# Patient Record
Sex: MALE | Race: WHITE | Employment: UNEMPLOYED | URBAN - METROPOLITAN AREA
[De-identification: names, ages, dates, MRNs, and addresses within clinical notes are randomized per-mention and may not be internally consistent; named-entity substitution may affect disease eponyms.]

---

## 2017-09-14 ENCOUNTER — APPOINTMENT (OUTPATIENT)
Dept: GENERAL RADIOLOGY | Age: 67
End: 2017-09-14
Payer: MEDICARE

## 2017-09-14 ENCOUNTER — HOSPITAL ENCOUNTER (OUTPATIENT)
Age: 67
Setting detail: OBSERVATION
Discharge: NURSING, REHAB OR LONG-TERM CARE | End: 2017-09-15
Attending: INTERNAL MEDICINE | Admitting: INTERNAL MEDICINE
Payer: MEDICARE

## 2017-09-14 DIAGNOSIS — R06.09 DYSPNEA ON EXERTION: Primary | ICD-10-CM

## 2017-09-14 DIAGNOSIS — R60.9 PERIPHERAL EDEMA: ICD-10-CM

## 2017-09-14 DIAGNOSIS — L30.9 DERMATITIS: ICD-10-CM

## 2017-09-14 DIAGNOSIS — R79.89 ELEVATED D-DIMER: ICD-10-CM

## 2017-09-14 LAB
BASOPHILS ABSOLUTE: 0.1 K/UL (ref 0–0.2)
BASOPHILS RELATIVE PERCENT: 0.9 %
EOSINOPHILS ABSOLUTE: 3.9 K/UL (ref 0–0.7)
EOSINOPHILS RELATIVE PERCENT: 28.9 %
HCT VFR BLD CALC: 37 % (ref 42–52)
HEMOGLOBIN: 12 G/DL (ref 14–18)
LYMPHOCYTES ABSOLUTE: 2.1 K/UL (ref 1–4.8)
LYMPHOCYTES RELATIVE PERCENT: 15.7 %
MCH RBC QN AUTO: 31.4 PG (ref 27–31.3)
MCHC RBC AUTO-ENTMCNC: 32.5 % (ref 33–37)
MCV RBC AUTO: 96.7 FL (ref 80–100)
MONOCYTES ABSOLUTE: 1.2 K/UL (ref 0.2–0.8)
MONOCYTES RELATIVE PERCENT: 8.8 %
NEUTROPHILS ABSOLUTE: 6.1 K/UL (ref 1.4–6.5)
NEUTROPHILS RELATIVE PERCENT: 45.7 %
PDW BLD-RTO: 14.5 % (ref 11.5–14.5)
PLATELET # BLD: 183 K/UL (ref 130–400)
RBC # BLD: 3.83 M/UL (ref 4.7–6.1)
WBC # BLD: 13.4 K/UL (ref 4.8–10.8)

## 2017-09-14 PROCEDURE — 80053 COMPREHEN METABOLIC PANEL: CPT

## 2017-09-14 PROCEDURE — 6360000002 HC RX W HCPCS: Performed by: PHYSICIAN ASSISTANT

## 2017-09-14 PROCEDURE — 71010 XR CHEST PORTABLE: CPT

## 2017-09-14 PROCEDURE — 85025 COMPLETE CBC W/AUTO DIFF WBC: CPT

## 2017-09-14 PROCEDURE — 96375 TX/PRO/DX INJ NEW DRUG ADDON: CPT

## 2017-09-14 PROCEDURE — 83735 ASSAY OF MAGNESIUM: CPT

## 2017-09-14 PROCEDURE — 82550 ASSAY OF CK (CPK): CPT

## 2017-09-14 PROCEDURE — 82553 CREATINE MB FRACTION: CPT

## 2017-09-14 PROCEDURE — 85379 FIBRIN DEGRADATION QUANT: CPT

## 2017-09-14 PROCEDURE — 99285 EMERGENCY DEPT VISIT HI MDM: CPT

## 2017-09-14 PROCEDURE — 84484 ASSAY OF TROPONIN QUANT: CPT

## 2017-09-14 PROCEDURE — 93005 ELECTROCARDIOGRAM TRACING: CPT

## 2017-09-14 PROCEDURE — 83880 ASSAY OF NATRIURETIC PEPTIDE: CPT

## 2017-09-14 PROCEDURE — 36415 COLL VENOUS BLD VENIPUNCTURE: CPT

## 2017-09-14 PROCEDURE — 96374 THER/PROPH/DIAG INJ IV PUSH: CPT

## 2017-09-14 RX ORDER — DIPHENHYDRAMINE HYDROCHLORIDE 50 MG/ML
50 INJECTION INTRAMUSCULAR; INTRAVENOUS ONCE
Status: COMPLETED | OUTPATIENT
Start: 2017-09-14 | End: 2017-09-14

## 2017-09-14 RX ADMIN — DIPHENHYDRAMINE HYDROCHLORIDE 50 MG: 50 INJECTION, SOLUTION INTRAMUSCULAR; INTRAVENOUS at 23:29

## 2017-09-15 ENCOUNTER — APPOINTMENT (OUTPATIENT)
Dept: ULTRASOUND IMAGING | Age: 67
End: 2017-09-15
Payer: MEDICARE

## 2017-09-15 ENCOUNTER — APPOINTMENT (OUTPATIENT)
Dept: CT IMAGING | Age: 67
End: 2017-09-15
Payer: MEDICARE

## 2017-09-15 VITALS
TEMPERATURE: 97.7 F | BODY MASS INDEX: 30.46 KG/M2 | RESPIRATION RATE: 16 BRPM | HEIGHT: 68 IN | WEIGHT: 201 LBS | HEART RATE: 100 BPM | SYSTOLIC BLOOD PRESSURE: 149 MMHG | DIASTOLIC BLOOD PRESSURE: 69 MMHG | OXYGEN SATURATION: 98 %

## 2017-09-15 PROBLEM — D64.9 ANEMIA: Status: ACTIVE | Noted: 2017-09-15

## 2017-09-15 PROBLEM — J44.9 COPD (CHRONIC OBSTRUCTIVE PULMONARY DISEASE) (HCC): Status: ACTIVE | Noted: 2017-09-15

## 2017-09-15 PROBLEM — I82.412 DEEP VEIN THROMBOSIS (DVT) OF FEMORAL VEIN OF LEFT LOWER EXTREMITY (HCC): Chronic | Status: ACTIVE | Noted: 2017-09-15

## 2017-09-15 PROBLEM — R60.0 BILATERAL LEG EDEMA: Status: ACTIVE | Noted: 2017-09-15
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
## 2017-09-15 LAB
ABO/RH: NORMAL
ALBUMIN SERPL-MCNC: 3.5 G/DL (ref 3.9–4.9)
ALP BLD-CCNC: 73 U/L (ref 35–104)
ALT SERPL-CCNC: 13 U/L (ref 0–41)
ANION GAP SERPL CALCULATED.3IONS-SCNC: 13 MEQ/L (ref 7–13)
ANION GAP SERPL CALCULATED.3IONS-SCNC: 15 MEQ/L (ref 7–13)
ANTIBODY SCREEN: NORMAL
AST SERPL-CCNC: 19 U/L (ref 0–40)
BASOPHILS ABSOLUTE: 0.1 K/UL (ref 0–0.2)
BASOPHILS RELATIVE PERCENT: 0.8 %
BILIRUB SERPL-MCNC: 0.3 MG/DL (ref 0–1.2)
BUN BLDV-MCNC: 18 MG/DL (ref 8–23)
BUN BLDV-MCNC: 19 MG/DL (ref 8–23)
CALCIUM SERPL-MCNC: 8 MG/DL (ref 8.6–10.2)
CALCIUM SERPL-MCNC: 8.5 MG/DL (ref 8.6–10.2)
CHLORIDE BLD-SCNC: 102 MEQ/L (ref 98–107)
CHLORIDE BLD-SCNC: 103 MEQ/L (ref 98–107)
CK MB: 1.6 NG/ML (ref 0–6.7)
CK MB: 1.7 NG/ML (ref 0–6.7)
CK MB: 2.6 NG/ML (ref 0–6.7)
CO2: 25 MEQ/L (ref 22–29)
CO2: 25 MEQ/L (ref 22–29)
CREAT SERPL-MCNC: 1.45 MG/DL (ref 0.7–1.2)
CREAT SERPL-MCNC: 1.52 MG/DL (ref 0.7–1.2)
CREATINE KINASE-MB INDEX: 0.9 % (ref 0–3.5)
CREATINE KINASE-MB INDEX: 1 % (ref 0–3.5)
CREATINE KINASE-MB INDEX: 1.2 % (ref 0–3.5)
D DIMER: 20.77 MG/L FEU (ref 0–0.5)
EOSINOPHILS ABSOLUTE: 3.2 K/UL (ref 0–0.7)
EOSINOPHILS RELATIVE PERCENT: 32.6 %
FERRITIN: 233.1 NG/ML (ref 30–400)
FOLATE: 9.8 NG/ML (ref 7.3–26.1)
GFR AFRICAN AMERICAN: 55.6
GFR AFRICAN AMERICAN: 58.8
GFR NON-AFRICAN AMERICAN: 46
GFR NON-AFRICAN AMERICAN: 48.6
GLOBULIN: 3.6 G/DL (ref 2.3–3.5)
GLUCOSE BLD-MCNC: 102 MG/DL (ref 74–109)
GLUCOSE BLD-MCNC: 107 MG/DL (ref 74–109)
HCT VFR BLD CALC: 32.9 % (ref 42–52)
HEMOGLOBIN: 10.8 G/DL (ref 14–18)
IRON SATURATION: 29 % (ref 11–46)
IRON: 73 UG/DL (ref 59–158)
LACTATE DEHYDROGENASE: 372 U/L (ref 135–225)
LV EF: 50 %
LVEF MODALITY: NORMAL
LYMPHOCYTES ABSOLUTE: 2.2 K/UL (ref 1–4.8)
LYMPHOCYTES RELATIVE PERCENT: 22 %
MAGNESIUM: 2.1 MG/DL (ref 1.7–2.3)
MCH RBC QN AUTO: 31.9 PG (ref 27–31.3)
MCHC RBC AUTO-ENTMCNC: 32.8 % (ref 33–37)
MCV RBC AUTO: 97.2 FL (ref 80–100)
MONOCYTES ABSOLUTE: 0.9 K/UL (ref 0.2–0.8)
MONOCYTES RELATIVE PERCENT: 8.7 %
NEUTROPHILS ABSOLUTE: 3.6 K/UL (ref 1.4–6.5)
NEUTROPHILS RELATIVE PERCENT: 35.9 %
PDW BLD-RTO: 14.6 % (ref 11.5–14.5)
PLATELET # BLD: 164 K/UL (ref 130–400)
POTASSIUM SERPL-SCNC: 4.1 MEQ/L (ref 3.5–5.1)
POTASSIUM SERPL-SCNC: 4.4 MEQ/L (ref 3.5–5.1)
PRO-BNP: 33 PG/ML
RBC # BLD: 3.39 M/UL (ref 4.7–6.1)
SODIUM BLD-SCNC: 141 MEQ/L (ref 132–144)
SODIUM BLD-SCNC: 142 MEQ/L (ref 132–144)
TOTAL CK: 173 U/L (ref 0–190)
TOTAL CK: 181 U/L (ref 0–190)
TOTAL CK: 217 U/L (ref 0–190)
TOTAL IRON BINDING CAPACITY: 253 UG/DL (ref 178–450)
TOTAL PROTEIN: 7.1 G/DL (ref 6.4–8.1)
TROPONIN: <0.01 NG/ML (ref 0–0.01)
VITAMIN B-12: 385 PG/ML (ref 211–946)
WBC # BLD: 9.9 K/UL (ref 4.8–10.8)

## 2017-09-15 PROCEDURE — 82746 ASSAY OF FOLIC ACID SERUM: CPT

## 2017-09-15 PROCEDURE — 82553 CREATINE MB FRACTION: CPT

## 2017-09-15 PROCEDURE — G0378 HOSPITAL OBSERVATION PER HR: HCPCS

## 2017-09-15 PROCEDURE — 82728 ASSAY OF FERRITIN: CPT

## 2017-09-15 PROCEDURE — 83010 ASSAY OF HAPTOGLOBIN QUANT: CPT

## 2017-09-15 PROCEDURE — 76775 US EXAM ABDO BACK WALL LIM: CPT

## 2017-09-15 PROCEDURE — 83615 LACTATE (LD) (LDH) ENZYME: CPT

## 2017-09-15 PROCEDURE — 96375 TX/PRO/DX INJ NEW DRUG ADDON: CPT

## 2017-09-15 PROCEDURE — 83540 ASSAY OF IRON: CPT

## 2017-09-15 PROCEDURE — 6360000002 HC RX W HCPCS: Performed by: PHYSICIAN ASSISTANT

## 2017-09-15 PROCEDURE — 93306 TTE W/DOPPLER COMPLETE: CPT

## 2017-09-15 PROCEDURE — 82607 VITAMIN B-12: CPT

## 2017-09-15 PROCEDURE — 2580000003 HC RX 258: Performed by: INTERNAL MEDICINE

## 2017-09-15 PROCEDURE — 85025 COMPLETE CBC W/AUTO DIFF WBC: CPT

## 2017-09-15 PROCEDURE — 36415 COLL VENOUS BLD VENIPUNCTURE: CPT

## 2017-09-15 PROCEDURE — 84484 ASSAY OF TROPONIN QUANT: CPT

## 2017-09-15 PROCEDURE — 6360000004 HC RX CONTRAST MEDICATION: Performed by: RADIOLOGY

## 2017-09-15 PROCEDURE — 71275 CT ANGIOGRAPHY CHEST: CPT

## 2017-09-15 PROCEDURE — 80048 BASIC METABOLIC PNL TOTAL CA: CPT

## 2017-09-15 PROCEDURE — 83550 IRON BINDING TEST: CPT

## 2017-09-15 PROCEDURE — 99222 1ST HOSP IP/OBS MODERATE 55: CPT | Performed by: INTERNAL MEDICINE

## 2017-09-15 PROCEDURE — 96372 THER/PROPH/DIAG INJ SC/IM: CPT

## 2017-09-15 PROCEDURE — 86850 RBC ANTIBODY SCREEN: CPT

## 2017-09-15 PROCEDURE — 96365 THER/PROPH/DIAG IV INF INIT: CPT

## 2017-09-15 PROCEDURE — 99214 OFFICE O/P EST MOD 30 MIN: CPT | Performed by: INTERNAL MEDICINE

## 2017-09-15 PROCEDURE — 82550 ASSAY OF CK (CPK): CPT

## 2017-09-15 PROCEDURE — 86900 BLOOD TYPING SEROLOGIC ABO: CPT

## 2017-09-15 PROCEDURE — 93970 EXTREMITY STUDY: CPT

## 2017-09-15 PROCEDURE — 6360000002 HC RX W HCPCS: Performed by: INTERNAL MEDICINE

## 2017-09-15 PROCEDURE — 99222 1ST HOSP IP/OBS MODERATE 55: CPT | Performed by: CLINICAL NURSE SPECIALIST

## 2017-09-15 PROCEDURE — 94664 DEMO&/EVAL PT USE INHALER: CPT

## 2017-09-15 PROCEDURE — 86901 BLOOD TYPING SEROLOGIC RH(D): CPT

## 2017-09-15 PROCEDURE — 6370000000 HC RX 637 (ALT 250 FOR IP): Performed by: INTERNAL MEDICINE

## 2017-09-15 RX ORDER — HYDROXYZINE HYDROCHLORIDE 50 MG/ML
50 INJECTION, SOLUTION INTRAMUSCULAR EVERY 6 HOURS PRN
Status: DISCONTINUED | OUTPATIENT
Start: 2017-09-15 | End: 2017-09-16 | Stop reason: HOSPADM

## 2017-09-15 RX ORDER — ACETAMINOPHEN 325 MG/1
650 TABLET ORAL EVERY 4 HOURS PRN
Status: DISCONTINUED | OUTPATIENT
Start: 2017-09-15 | End: 2017-09-15 | Stop reason: SDUPTHER

## 2017-09-15 RX ORDER — IPRATROPIUM BROMIDE AND ALBUTEROL SULFATE 2.5; .5 MG/3ML; MG/3ML
1 SOLUTION RESPIRATORY (INHALATION) EVERY 4 HOURS PRN
Status: DISCONTINUED | OUTPATIENT
Start: 2017-09-15 | End: 2017-09-16 | Stop reason: HOSPADM

## 2017-09-15 RX ORDER — IPRATROPIUM BROMIDE AND ALBUTEROL SULFATE 2.5; .5 MG/3ML; MG/3ML
1 SOLUTION RESPIRATORY (INHALATION) 3 TIMES DAILY
Status: DISCONTINUED | OUTPATIENT
Start: 2017-09-15 | End: 2017-09-15

## 2017-09-15 RX ORDER — ACETAMINOPHEN 325 MG/1
650 TABLET ORAL EVERY 4 HOURS PRN
Status: DISCONTINUED | OUTPATIENT
Start: 2017-09-15 | End: 2017-09-16 | Stop reason: HOSPADM

## 2017-09-15 RX ORDER — METHYLPREDNISOLONE SODIUM SUCCINATE 40 MG/ML
40 INJECTION, POWDER, LYOPHILIZED, FOR SOLUTION INTRAMUSCULAR; INTRAVENOUS EVERY 8 HOURS
Status: DISCONTINUED | OUTPATIENT
Start: 2017-09-15 | End: 2017-09-16 | Stop reason: HOSPADM

## 2017-09-15 RX ORDER — SODIUM CHLORIDE 0.9 % (FLUSH) 0.9 %
10 SYRINGE (ML) INJECTION PRN
Status: DISCONTINUED | OUTPATIENT
Start: 2017-09-15 | End: 2017-09-16 | Stop reason: HOSPADM

## 2017-09-15 RX ORDER — FOLIC ACID 1 MG/1
1 TABLET ORAL DAILY
Qty: 30 TABLET | Refills: 0 | DISCHARGE
Start: 2017-09-15

## 2017-09-15 RX ORDER — HALOPERIDOL 5 MG
5 TABLET ORAL EVERY 4 HOURS PRN
Status: DISCONTINUED | OUTPATIENT
Start: 2017-09-15 | End: 2017-09-16 | Stop reason: HOSPADM

## 2017-09-15 RX ORDER — HYDROXYZINE PAMOATE 25 MG/1
50 CAPSULE ORAL EVERY 6 HOURS PRN
Status: DISCONTINUED | OUTPATIENT
Start: 2017-09-15 | End: 2017-09-16 | Stop reason: HOSPADM

## 2017-09-15 RX ORDER — ONDANSETRON 2 MG/ML
4 INJECTION INTRAMUSCULAR; INTRAVENOUS EVERY 6 HOURS PRN
Status: DISCONTINUED | OUTPATIENT
Start: 2017-09-15 | End: 2017-09-16 | Stop reason: HOSPADM

## 2017-09-15 RX ORDER — SODIUM CHLORIDE 0.9 % (FLUSH) 0.9 %
10 SYRINGE (ML) INJECTION EVERY 12 HOURS SCHEDULED
Status: DISCONTINUED | OUTPATIENT
Start: 2017-09-15 | End: 2017-09-15 | Stop reason: SDUPTHER

## 2017-09-15 RX ORDER — SODIUM CHLORIDE 0.9 % (FLUSH) 0.9 %
10 SYRINGE (ML) INJECTION EVERY 12 HOURS SCHEDULED
Status: DISCONTINUED | OUTPATIENT
Start: 2017-09-15 | End: 2017-09-16 | Stop reason: HOSPADM

## 2017-09-15 RX ORDER — SODIUM CHLORIDE 0.9 % (FLUSH) 0.9 %
10 SYRINGE (ML) INJECTION PRN
Status: DISCONTINUED | OUTPATIENT
Start: 2017-09-15 | End: 2017-09-15 | Stop reason: SDUPTHER

## 2017-09-15 RX ORDER — IPRATROPIUM BROMIDE AND ALBUTEROL SULFATE 2.5; .5 MG/3ML; MG/3ML
1 SOLUTION RESPIRATORY (INHALATION) 3 TIMES DAILY
Status: DISCONTINUED | OUTPATIENT
Start: 2017-09-15 | End: 2017-09-15 | Stop reason: SDUPTHER

## 2017-09-15 RX ORDER — IPRATROPIUM BROMIDE AND ALBUTEROL SULFATE 2.5; .5 MG/3ML; MG/3ML
3 SOLUTION RESPIRATORY (INHALATION) EVERY 4 HOURS PRN
Qty: 360 ML | DISCHARGE
Start: 2017-09-15

## 2017-09-15 RX ORDER — HALOPERIDOL 5 MG/ML
5 INJECTION INTRAMUSCULAR EVERY 6 HOURS PRN
Status: DISCONTINUED | OUTPATIENT
Start: 2017-09-15 | End: 2017-09-16 | Stop reason: HOSPADM

## 2017-09-15 RX ORDER — THIAMINE MONONITRATE (VIT B1) 100 MG
100 TABLET ORAL DAILY
Qty: 30 TABLET | Refills: 0 | DISCHARGE
Start: 2017-09-15

## 2017-09-15 RX ORDER — M-VIT,TX,IRON,MINS/CALC/FOLIC 27MG-0.4MG
1 TABLET ORAL DAILY
Qty: 90 TABLET | Refills: 0 | Status: ON HOLD | DISCHARGE
Start: 2017-09-15 | End: 2019-01-19 | Stop reason: HOSPADM

## 2017-09-15 RX ADMIN — METHYLPREDNISOLONE SODIUM SUCCINATE 40 MG: 40 INJECTION, POWDER, FOR SOLUTION INTRAMUSCULAR; INTRAVENOUS at 10:31

## 2017-09-15 RX ADMIN — ENOXAPARIN SODIUM 90 MG: 100 INJECTION SUBCUTANEOUS at 01:57

## 2017-09-15 RX ADMIN — Medication 10 ML: at 10:22

## 2017-09-15 RX ADMIN — IOPAMIDOL 100 ML: 755 INJECTION, SOLUTION INTRAVENOUS at 01:36

## 2017-09-15 RX ADMIN — DEXTROSE MONOHYDRATE 250 MG: 5 INJECTION, SOLUTION INTRAVENOUS at 10:32

## 2017-09-15 RX ADMIN — RIVAROXABAN 15 MG: 15 TABLET, FILM COATED ORAL at 17:12

## 2017-09-15 ASSESSMENT — ENCOUNTER SYMPTOMS
EYE DISCHARGE: 0
ANAL BLEEDING: 0
VOICE CHANGE: 0
APNEA: 0
BLOOD IN STOOL: 0
EYES NEGATIVE: 1
ABDOMINAL DISTENTION: 0
PHOTOPHOBIA: 0
COUGH: 0
STRIDOR: 0
GASTROINTESTINAL NEGATIVE: 1
NAUSEA: 0
WHEEZING: 0
SHORTNESS OF BREATH: 1
CHEST TIGHTNESS: 0
VOMITING: 0

## 2017-09-15 ASSESSMENT — PAIN SCALES - GENERAL: PAINLEVEL_OUTOF10: 0

## 2017-09-17 LAB — HAPTOGLOBIN: 188 MG/DL (ref 30–200)

## 2017-09-18 LAB
EKG ATRIAL RATE: 85 BPM
EKG P AXIS: 23 DEGREES
EKG P-R INTERVAL: 172 MS
EKG Q-T INTERVAL: 362 MS
EKG QRS DURATION: 72 MS
EKG QTC CALCULATION (BAZETT): 430 MS
EKG R AXIS: 68 DEGREES
EKG T AXIS: 20 DEGREES
EKG VENTRICULAR RATE: 85 BPM

## 2017-09-18 PROCEDURE — 93010 ELECTROCARDIOGRAM REPORT: CPT | Performed by: INTERNAL MEDICINE

## 2018-01-05 ENCOUNTER — HOSPITAL ENCOUNTER (OUTPATIENT)
Dept: MRI IMAGING | Age: 68
Discharge: HOME | End: 2018-01-05
Payer: MEDICARE

## 2018-01-05 DIAGNOSIS — R41.3 MEMORY LOSS: ICD-10-CM

## 2018-01-05 PROCEDURE — 70551 MRI BRAIN STEM W/O DYE: CPT

## 2018-02-22 ENCOUNTER — APPOINTMENT (OUTPATIENT)
Dept: CT IMAGING | Age: 68
End: 2018-02-22
Payer: MEDICARE

## 2018-02-22 ENCOUNTER — HOSPITAL ENCOUNTER (OUTPATIENT)
Age: 68
Setting detail: OBSERVATION
Discharge: HOME | End: 2018-02-23
Attending: INTERNAL MEDICINE | Admitting: INTERNAL MEDICINE
Payer: MEDICARE

## 2018-02-22 ENCOUNTER — APPOINTMENT (OUTPATIENT)
Dept: GENERAL RADIOLOGY | Age: 68
End: 2018-02-22
Payer: MEDICARE

## 2018-02-22 DIAGNOSIS — R41.82 ALTERED MENTAL STATUS, UNSPECIFIED ALTERED MENTAL STATUS TYPE: Primary | ICD-10-CM

## 2018-02-22 DIAGNOSIS — R42 LIGHTHEADEDNESS: ICD-10-CM

## 2018-02-22 DIAGNOSIS — Z74.09 IMPAIRED MOBILITY AND ADLS: ICD-10-CM

## 2018-02-22 DIAGNOSIS — Z78.9 IMPAIRED MOBILITY AND ADLS: ICD-10-CM

## 2018-02-22 LAB
ALBUMIN SERPL-MCNC: 3.9 G/DL (ref 3.9–4.9)
ALP BLD-CCNC: 59 U/L (ref 35–104)
ALT SERPL-CCNC: 7 U/L (ref 0–41)
ANION GAP SERPL CALCULATED.3IONS-SCNC: 16 MEQ/L (ref 7–13)
APTT: 27.3 SEC (ref 21.6–35.4)
AST SERPL-CCNC: 12 U/L (ref 0–40)
BASOPHILS ABSOLUTE: 0.1 K/UL (ref 0–0.2)
BASOPHILS RELATIVE PERCENT: 0.8 %
BILIRUB SERPL-MCNC: 0.3 MG/DL (ref 0–1.2)
BILIRUBIN URINE: NEGATIVE
BLOOD, URINE: NEGATIVE
BUN BLDV-MCNC: 38 MG/DL (ref 8–23)
C-REACTIVE PROTEIN, HIGH SENSITIVITY: 90.7 MG/L (ref 0–5)
CALCIUM SERPL-MCNC: 9.3 MG/DL (ref 8.6–10.2)
CHLORIDE BLD-SCNC: 94 MEQ/L (ref 98–107)
CHOLESTEROL, TOTAL: 162 MG/DL (ref 0–199)
CHP ED QC CHECK: NORMAL
CK MB: 1.1 NG/ML (ref 0–6.7)
CLARITY: CLEAR
CO2: 26 MEQ/L (ref 22–29)
COLOR: YELLOW
CREAT SERPL-MCNC: 1.55 MG/DL (ref 0.7–1.2)
CREATINE KINASE-MB INDEX: 0.5 % (ref 0–3.5)
EKG ATRIAL RATE: 67 BPM
EKG P AXIS: 27 DEGREES
EKG P-R INTERVAL: 218 MS
EKG Q-T INTERVAL: 396 MS
EKG QRS DURATION: 82 MS
EKG QTC CALCULATION (BAZETT): 418 MS
EKG R AXIS: 59 DEGREES
EKG T AXIS: 21 DEGREES
EKG VENTRICULAR RATE: 67 BPM
EOSINOPHILS ABSOLUTE: 0.3 K/UL (ref 0–0.7)
EOSINOPHILS RELATIVE PERCENT: 2.8 %
FOLATE: 11.2 NG/ML (ref 7.3–26.1)
GFR AFRICAN AMERICAN: 54.3
GFR NON-AFRICAN AMERICAN: 44.9
GLOBULIN: 3.6 G/DL (ref 2.3–3.5)
GLUCOSE BLD-MCNC: 119 MG/DL (ref 74–109)
GLUCOSE BLD-MCNC: 122 MG/DL
GLUCOSE BLD-MCNC: 122 MG/DL (ref 60–115)
GLUCOSE URINE: NEGATIVE MG/DL
HCT VFR BLD CALC: 34.8 % (ref 42–52)
HDLC SERPL-MCNC: 37 MG/DL (ref 40–59)
HEMOGLOBIN: 11.3 G/DL (ref 14–18)
INR BLD: 1.1
KETONES, URINE: NEGATIVE MG/DL
LACTIC ACID: 1.7 MMOL/L (ref 0.5–2.2)
LDL CHOLESTEROL CALCULATED: 96 MG/DL (ref 0–129)
LEUKOCYTE ESTERASE, URINE: NEGATIVE
LYMPHOCYTES ABSOLUTE: 2 K/UL (ref 1–4.8)
LYMPHOCYTES RELATIVE PERCENT: 17.4 %
MAGNESIUM: 2.6 MG/DL (ref 1.7–2.3)
MCH RBC QN AUTO: 31.7 PG (ref 27–31.3)
MCHC RBC AUTO-ENTMCNC: 32.5 % (ref 33–37)
MCV RBC AUTO: 97.6 FL (ref 80–100)
MONOCYTES ABSOLUTE: 1.2 K/UL (ref 0.2–0.8)
MONOCYTES RELATIVE PERCENT: 10.6 %
NEUTROPHILS ABSOLUTE: 7.9 K/UL (ref 1.4–6.5)
NEUTROPHILS RELATIVE PERCENT: 68.4 %
NITRITE, URINE: NEGATIVE
PDW BLD-RTO: 13.8 % (ref 11.5–14.5)
PERFORMED ON: ABNORMAL
PH UA: 5 (ref 5–9)
PLATELET # BLD: 169 K/UL (ref 130–400)
POTASSIUM SERPL-SCNC: 4 MEQ/L (ref 3.5–5.1)
PRO-BNP: 204 PG/ML
PROTEIN UA: NEGATIVE MG/DL
PROTHROMBIN TIME: 11.3 SEC (ref 8.1–13.7)
RBC # BLD: 3.56 M/UL (ref 4.7–6.1)
SODIUM BLD-SCNC: 136 MEQ/L (ref 132–144)
SPECIFIC GRAVITY UA: 1.02 (ref 1–1.03)
TOTAL CK: 242 U/L (ref 0–190)
TOTAL PROTEIN: 7.5 G/DL (ref 6.4–8.1)
TRIGL SERPL-MCNC: 144 MG/DL (ref 0–200)
TROPONIN: <0.01 NG/ML (ref 0–0.01)
TSH SERPL DL<=0.05 MIU/L-ACNC: 0.28 UIU/ML (ref 0.27–4.2)
URINE REFLEX TO CULTURE: NORMAL
UROBILINOGEN, URINE: 0.2 E.U./DL
VITAMIN B-12: 683 PG/ML (ref 232–1245)
WBC # BLD: 11.6 K/UL (ref 4.8–10.8)

## 2018-02-22 PROCEDURE — 85025 COMPLETE CBC W/AUTO DIFF WBC: CPT

## 2018-02-22 PROCEDURE — G0378 HOSPITAL OBSERVATION PER HR: HCPCS

## 2018-02-22 PROCEDURE — 80061 LIPID PANEL: CPT

## 2018-02-22 PROCEDURE — 80053 COMPREHEN METABOLIC PANEL: CPT

## 2018-02-22 PROCEDURE — 86141 C-REACTIVE PROTEIN HS: CPT

## 2018-02-22 PROCEDURE — 85610 PROTHROMBIN TIME: CPT

## 2018-02-22 PROCEDURE — 94664 DEMO&/EVAL PT USE INHALER: CPT

## 2018-02-22 PROCEDURE — 82553 CREATINE MB FRACTION: CPT

## 2018-02-22 PROCEDURE — 81003 URINALYSIS AUTO W/O SCOPE: CPT

## 2018-02-22 PROCEDURE — 2580000003 HC RX 258: Performed by: NURSE PRACTITIONER

## 2018-02-22 PROCEDURE — 82607 VITAMIN B-12: CPT

## 2018-02-22 PROCEDURE — 83605 ASSAY OF LACTIC ACID: CPT

## 2018-02-22 PROCEDURE — 36415 COLL VENOUS BLD VENIPUNCTURE: CPT

## 2018-02-22 PROCEDURE — 82746 ASSAY OF FOLIC ACID SERUM: CPT

## 2018-02-22 PROCEDURE — 71046 X-RAY EXAM CHEST 2 VIEWS: CPT

## 2018-02-22 PROCEDURE — 84484 ASSAY OF TROPONIN QUANT: CPT

## 2018-02-22 PROCEDURE — 70450 CT HEAD/BRAIN W/O DYE: CPT

## 2018-02-22 PROCEDURE — 83880 ASSAY OF NATRIURETIC PEPTIDE: CPT

## 2018-02-22 PROCEDURE — 82550 ASSAY OF CK (CPK): CPT

## 2018-02-22 PROCEDURE — 84443 ASSAY THYROID STIM HORMONE: CPT

## 2018-02-22 PROCEDURE — 85730 THROMBOPLASTIN TIME PARTIAL: CPT

## 2018-02-22 PROCEDURE — 87040 BLOOD CULTURE FOR BACTERIA: CPT

## 2018-02-22 PROCEDURE — 93005 ELECTROCARDIOGRAM TRACING: CPT

## 2018-02-22 PROCEDURE — 83735 ASSAY OF MAGNESIUM: CPT

## 2018-02-22 PROCEDURE — 99285 EMERGENCY DEPT VISIT HI MDM: CPT

## 2018-02-22 RX ORDER — PRAVASTATIN SODIUM 40 MG
40 TABLET ORAL DAILY
COMMUNITY

## 2018-02-22 RX ORDER — PRAVASTATIN SODIUM 40 MG
40 TABLET ORAL DAILY
Status: DISCONTINUED | OUTPATIENT
Start: 2018-02-23 | End: 2018-02-23 | Stop reason: HOSPADM

## 2018-02-22 RX ORDER — ONDANSETRON 2 MG/ML
4 INJECTION INTRAMUSCULAR; INTRAVENOUS EVERY 6 HOURS PRN
Status: DISCONTINUED | OUTPATIENT
Start: 2018-02-22 | End: 2018-02-23 | Stop reason: HOSPADM

## 2018-02-22 RX ORDER — LISINOPRIL AND HYDROCHLOROTHIAZIDE 25; 20 MG/1; MG/1
1 TABLET ORAL DAILY
Status: ON HOLD | COMMUNITY
End: 2019-01-19 | Stop reason: HOSPADM

## 2018-02-22 RX ORDER — DONEPEZIL HYDROCHLORIDE 10 MG/1
10 TABLET, FILM COATED ORAL 2 TIMES DAILY
Status: DISCONTINUED | OUTPATIENT
Start: 2018-02-23 | End: 2018-02-23 | Stop reason: HOSPADM

## 2018-02-22 RX ORDER — ASPIRIN 81 MG/1
81 TABLET, CHEWABLE ORAL DAILY
Status: DISCONTINUED | OUTPATIENT
Start: 2018-02-23 | End: 2018-02-23 | Stop reason: HOSPADM

## 2018-02-22 RX ORDER — CARVEDILOL 12.5 MG/1
12.5 TABLET ORAL 2 TIMES DAILY WITH MEALS
Status: DISCONTINUED | OUTPATIENT
Start: 2018-02-23 | End: 2018-02-23 | Stop reason: HOSPADM

## 2018-02-22 RX ORDER — M-VIT,TX,IRON,MINS/CALC/FOLIC 27MG-0.4MG
1 TABLET ORAL DAILY
Status: DISCONTINUED | OUTPATIENT
Start: 2018-02-23 | End: 2018-02-23 | Stop reason: HOSPADM

## 2018-02-22 RX ORDER — RISPERIDONE 2 MG/1
2 TABLET, FILM COATED ORAL NIGHTLY
Status: DISCONTINUED | OUTPATIENT
Start: 2018-02-23 | End: 2018-02-23 | Stop reason: HOSPADM

## 2018-02-22 RX ORDER — THIAMINE MONONITRATE (VIT B1) 100 MG
100 TABLET ORAL DAILY
Status: DISCONTINUED | OUTPATIENT
Start: 2018-02-23 | End: 2018-02-23 | Stop reason: HOSPADM

## 2018-02-22 RX ORDER — 0.9 % SODIUM CHLORIDE 0.9 %
1000 INTRAVENOUS SOLUTION INTRAVENOUS ONCE
Status: COMPLETED | OUTPATIENT
Start: 2018-02-22 | End: 2018-02-22

## 2018-02-22 RX ORDER — DIVALPROEX SODIUM 250 MG/1
250 TABLET, EXTENDED RELEASE ORAL 2 TIMES DAILY
Status: DISCONTINUED | OUTPATIENT
Start: 2018-02-23 | End: 2018-02-23 | Stop reason: HOSPADM

## 2018-02-22 RX ORDER — ACETAMINOPHEN 325 MG/1
650 TABLET ORAL EVERY 4 HOURS PRN
Status: DISCONTINUED | OUTPATIENT
Start: 2018-02-22 | End: 2018-02-23 | Stop reason: HOSPADM

## 2018-02-22 RX ORDER — FOLIC ACID 1 MG/1
1 TABLET ORAL DAILY
Status: DISCONTINUED | OUTPATIENT
Start: 2018-02-23 | End: 2018-02-23 | Stop reason: HOSPADM

## 2018-02-22 RX ORDER — SODIUM CHLORIDE 9 MG/ML
INJECTION, SOLUTION INTRAVENOUS ONCE
Status: COMPLETED | OUTPATIENT
Start: 2018-02-22 | End: 2018-02-23

## 2018-02-22 RX ORDER — DONEPEZIL HYDROCHLORIDE 10 MG/1
10 TABLET, FILM COATED ORAL 2 TIMES DAILY
COMMUNITY

## 2018-02-22 RX ORDER — CARVEDILOL 12.5 MG/1
12.5 TABLET ORAL 2 TIMES DAILY WITH MEALS
Status: ON HOLD | COMMUNITY
End: 2019-01-19 | Stop reason: HOSPADM

## 2018-02-22 RX ORDER — DIVALPROEX SODIUM 250 MG/1
250 TABLET, EXTENDED RELEASE ORAL 2 TIMES DAILY
COMMUNITY

## 2018-02-22 RX ORDER — LISINOPRIL 20 MG/1
20 TABLET ORAL DAILY
Status: DISCONTINUED | OUTPATIENT
Start: 2018-02-23 | End: 2018-02-23 | Stop reason: HOSPADM

## 2018-02-22 RX ORDER — IPRATROPIUM BROMIDE AND ALBUTEROL SULFATE 2.5; .5 MG/3ML; MG/3ML
3 SOLUTION RESPIRATORY (INHALATION) EVERY 4 HOURS PRN
Status: DISCONTINUED | OUTPATIENT
Start: 2018-02-22 | End: 2018-02-23 | Stop reason: HOSPADM

## 2018-02-22 RX ORDER — RISPERIDONE 2 MG/1
2 TABLET, FILM COATED ORAL NIGHTLY
COMMUNITY

## 2018-02-22 RX ORDER — MECLIZINE HYDROCHLORIDE 25 MG/1
12.5 TABLET ORAL 3 TIMES DAILY
Status: DISCONTINUED | OUTPATIENT
Start: 2018-02-22 | End: 2018-02-23 | Stop reason: HOSPADM

## 2018-02-22 RX ADMIN — SODIUM CHLORIDE 1000 ML: 9 INJECTION, SOLUTION INTRAVENOUS at 20:09

## 2018-02-22 ASSESSMENT — ENCOUNTER SYMPTOMS
ABDOMINAL PAIN: 0
EYE DEVIATION: 0
VISUAL CHANGE: 0
DIFFICULTY BREATHING: 0
NAUSEA: 0
VOMITING: 0

## 2018-02-23 ENCOUNTER — APPOINTMENT (OUTPATIENT)
Dept: ULTRASOUND IMAGING | Age: 68
End: 2018-02-23
Payer: MEDICARE

## 2018-02-23 VITALS
TEMPERATURE: 97.7 F | SYSTOLIC BLOOD PRESSURE: 110 MMHG | HEART RATE: 62 BPM | OXYGEN SATURATION: 98 % | DIASTOLIC BLOOD PRESSURE: 55 MMHG | HEIGHT: 68 IN | WEIGHT: 218 LBS | RESPIRATION RATE: 18 BRPM | BODY MASS INDEX: 33.04 KG/M2

## 2018-02-23 LAB
ANION GAP SERPL CALCULATED.3IONS-SCNC: 14 MEQ/L (ref 7–13)
BASOPHILS ABSOLUTE: 0.1 K/UL (ref 0–0.2)
BASOPHILS RELATIVE PERCENT: 0.7 %
BUN BLDV-MCNC: 36 MG/DL (ref 8–23)
CALCIUM SERPL-MCNC: 8.6 MG/DL (ref 8.6–10.2)
CHLORIDE BLD-SCNC: 102 MEQ/L (ref 98–107)
CO2: 27 MEQ/L (ref 22–29)
CREAT SERPL-MCNC: 1.46 MG/DL (ref 0.7–1.2)
EOSINOPHILS ABSOLUTE: 0.5 K/UL (ref 0–0.7)
EOSINOPHILS RELATIVE PERCENT: 5.2 %
GFR AFRICAN AMERICAN: 58.2
GFR NON-AFRICAN AMERICAN: 48.1
GLUCOSE BLD-MCNC: 91 MG/DL (ref 74–109)
HCT VFR BLD CALC: 30.9 % (ref 42–52)
HEMOGLOBIN: 10.4 G/DL (ref 14–18)
LYMPHOCYTES ABSOLUTE: 3 K/UL (ref 1–4.8)
LYMPHOCYTES RELATIVE PERCENT: 33.3 %
MCH RBC QN AUTO: 32.7 PG (ref 27–31.3)
MCHC RBC AUTO-ENTMCNC: 33.7 % (ref 33–37)
MCV RBC AUTO: 97 FL (ref 80–100)
MONOCYTES ABSOLUTE: 1.1 K/UL (ref 0.2–0.8)
MONOCYTES RELATIVE PERCENT: 12 %
NEUTROPHILS ABSOLUTE: 4.4 K/UL (ref 1.4–6.5)
NEUTROPHILS RELATIVE PERCENT: 48.8 %
PDW BLD-RTO: 14 % (ref 11.5–14.5)
PLATELET # BLD: 165 K/UL (ref 130–400)
POTASSIUM REFLEX MAGNESIUM: 3.8 MEQ/L (ref 3.5–5.1)
RBC # BLD: 3.19 M/UL (ref 4.7–6.1)
SODIUM BLD-SCNC: 143 MEQ/L (ref 132–144)
WBC # BLD: 9.1 K/UL (ref 4.8–10.8)

## 2018-02-23 PROCEDURE — G8987 SELF CARE CURRENT STATUS: HCPCS

## 2018-02-23 PROCEDURE — G8980 MOBILITY D/C STATUS: HCPCS

## 2018-02-23 PROCEDURE — 97165 OT EVAL LOW COMPLEX 30 MIN: CPT

## 2018-02-23 PROCEDURE — G8979 MOBILITY GOAL STATUS: HCPCS

## 2018-02-23 PROCEDURE — 36415 COLL VENOUS BLD VENIPUNCTURE: CPT

## 2018-02-23 PROCEDURE — 97161 PT EVAL LOW COMPLEX 20 MIN: CPT

## 2018-02-23 PROCEDURE — G8989 SELF CARE D/C STATUS: HCPCS

## 2018-02-23 PROCEDURE — 85025 COMPLETE CBC W/AUTO DIFF WBC: CPT

## 2018-02-23 PROCEDURE — 2580000003 HC RX 258: Performed by: INTERNAL MEDICINE

## 2018-02-23 PROCEDURE — 6370000000 HC RX 637 (ALT 250 FOR IP): Performed by: INTERNAL MEDICINE

## 2018-02-23 PROCEDURE — G0378 HOSPITAL OBSERVATION PER HR: HCPCS

## 2018-02-23 PROCEDURE — G8988 SELF CARE GOAL STATUS: HCPCS

## 2018-02-23 PROCEDURE — 80048 BASIC METABOLIC PNL TOTAL CA: CPT

## 2018-02-23 PROCEDURE — G8978 MOBILITY CURRENT STATUS: HCPCS

## 2018-02-23 PROCEDURE — 93970 EXTREMITY STUDY: CPT

## 2018-02-23 RX ADMIN — DONEPEZIL HYDROCHLORIDE 10 MG: 10 TABLET, FILM COATED ORAL at 02:07

## 2018-02-23 RX ADMIN — MECLIZINE HYDROCHLORIDE 12.5 MG: 25 TABLET ORAL at 16:05

## 2018-02-23 RX ADMIN — PRAVASTATIN SODIUM 40 MG: 40 TABLET ORAL at 09:36

## 2018-02-23 RX ADMIN — MECLIZINE HYDROCHLORIDE 12.5 MG: 25 TABLET ORAL at 02:06

## 2018-02-23 RX ADMIN — DONEPEZIL HYDROCHLORIDE 10 MG: 10 TABLET, FILM COATED ORAL at 09:34

## 2018-02-23 RX ADMIN — ASPIRIN 81 MG CHEWABLE TABLET 81 MG: 81 TABLET CHEWABLE at 09:35

## 2018-02-23 RX ADMIN — Medication 100 MG: at 09:34

## 2018-02-23 RX ADMIN — MULTIPLE VITAMINS W/ MINERALS TAB 1 TABLET: TAB at 09:34

## 2018-02-23 RX ADMIN — DIVALPROEX SODIUM 250 MG: 250 TABLET, EXTENDED RELEASE ORAL at 09:35

## 2018-02-23 RX ADMIN — RISPERIDONE 2 MG: 2 TABLET ORAL at 02:07

## 2018-02-23 RX ADMIN — FOLIC ACID 1 MG: 1 TABLET ORAL at 09:34

## 2018-02-23 RX ADMIN — SODIUM CHLORIDE: 9 INJECTION, SOLUTION INTRAVENOUS at 02:07

## 2018-02-23 RX ADMIN — DIVALPROEX SODIUM 250 MG: 250 TABLET, EXTENDED RELEASE ORAL at 02:46

## 2018-02-23 RX ADMIN — MECLIZINE HYDROCHLORIDE 12.5 MG: 25 TABLET ORAL at 09:35

## 2018-02-23 RX ADMIN — LISINOPRIL 20 MG: 20 TABLET ORAL at 09:36

## 2018-02-23 RX ADMIN — CARVEDILOL 12.5 MG: 12.5 TABLET, FILM COATED ORAL at 09:34

## 2018-02-23 RX ADMIN — RIVAROXABAN 20 MG: 20 TABLET, FILM COATED ORAL at 02:07

## 2018-02-23 NOTE — ED NOTES
Telemetry monitor placed on pt. Pt's brother will be accompanying pt to floor.       Griselda Gaucher, RN  02/22/18 5307

## 2018-02-23 NOTE — H&P
Hospital Medicine  History and Physical    Patient:  Fannie Love  MRN: [id number]    CHIEF COMPLAINT:    Chief Complaint   Patient presents with    Altered Mental Status     pt brought in by brother, he states pt \"has Dementia\", \"not himself all week\", & \"complained his stomach was upset & hasn't eaten much\". History Obtained From:  patient, family member - brother, electronic medical record  Primary Care Physician: Micah Greenwood DO    HISTORY OF PRESENT ILLNESS:   The patient is a 79 y.o. male with a previous history of Hyperlipidemia, HTN, CKD III, Previous DVT On Xarelto, and rapidly progressive dementia presenting from home with worsening gait instability and poor oral intake and frequent stooling. Patient has been following with Dr. Miriam Jurado, of neurology at [facility] in an attempt to diagnose the cause of the patient's rapidly progressive dementia. His present previously been evaluated for psychiatric disorders at [facility] hospital determined not to have any acute psychiatric condition. Presents today with severe diarrhea for several days along with bowel incontinence poor oral intake weak weakness fatigue and dizziness. Patient was somewhat hypotensive in the emergency department as well as 105/61, mild creatinine elevation above baseline with a BUN of 38 creatinine 1.55, and mild leukocytosis of 11.6. Because of the patient's dizziness ataxia weakness and frequent stools as determined to be unsafe to go home by himself. Past Medical History:      Diagnosis Date    Dementia     Hyperlipidemia     Hypertension        Past Surgical History:  History reviewed. No pertinent surgical history. Medications Prior to Admission:    Prior to Admission medications    Medication Sig Start Date End Date Taking?  Authorizing Provider   donepezil (ARICEPT) 10 MG tablet Take 10 mg by mouth 2 times daily   Yes Historical Provider, MD   pravastatin (PRAVACHOL) 40 MG tablet Take the T2 FLAIR imaging in the cerebrum. Outpatient workup still in progress. PT and OT to evaluate for gait instability. Continue Risperdal, Aricept, Depakote  4. Hypertension: Continue lisinopril and Coreg hold hydrochlorothiazide due to poor oral intake and diarrhea. 5. DVT prophylaxis not indicated. 6. Ataxic gait and gait instability: Consult PT and OT for assistance in ambulation. Neurology has ordered for outpatient Anti CRNP-S Ab, Anti amphiphysin Ab, Anti DI-1 Ab, Anti DI-2 Ab, Anti DI-3 AB, Anti A6NA-1 Ab, Anti PCAtr-2 AB, Anti LG-1 Ab, Anti CASPR Ab, Anti AMPA AB, Anti GLADYS Ab, AP1A&N Derrek Calcium Channel Ab, Muscle ACH receptor Ab, Ganglionic Ach Receptr AB, NMDA receptor Ab. Anti Amphiphysin Ab Anti 6AD65 Ab, however these labs are send outs, and I encouraged brother to have labs drawn following discharge. Paper prescriptions in hand from neurology. Patient Active Problem List   Diagnosis Code    Anemia D64.9    COPD (chronic obstructive pulmonary disease) ([facility] [address] J44.9    Bilateral leg edema R60.0    Insect bites W57. Deidra Gilbert    Dyspnea on exertion R06.09    Psychosis F29    Deep vein thrombosis (DVT) of femoral vein of left lower extremity ([facility] [address] I82.412    Dizziness R42       Preston Morales MD  Admitting Hospitalist    Emergency Contact:

## 2018-02-23 NOTE — ED NOTES
Second call placed to Stiven in lab. Stiven confirmed that urine specimens were received and appropriately labeled.       Love Diaz RN  02/22/18 2036

## 2018-02-23 NOTE — PROGRESS NOTES
Discussed patient with his PCP, Dr Lizbeth Leon. Per PCP pt is \"pleasantly confused\" at baseline. Seems to be very close to his baseline. Orthostatic vitals are negative and PT/OT eval is done and pt is independent. Will d/c pt to family.      Papito Thomas, [facility]
Physical Therapy Med Surg Initial Assessment  [facility]       NAME: Indu Wilson  : 1950 (25 y.o.)  MRN: [id number]  CODE STATUS: Full Code    Date of Service: 2018    Patient Diagnosis(es): Dizziness [R42]   Chief Complaint   Patient presents with    Altered Mental Status     pt brought in by brother, he states pt \"has Dementia\", \"not himself all week\", & \"complained his stomach was upset & hasn't eaten much\". Patient Active Problem List    Diagnosis Date Noted    Dizziness 2018    Anemia 09/15/2017    COPD (chronic obstructive pulmonary disease) ([facility] [address] 09/15/2017    Bilateral leg edema 09/15/2017    Insect bites 09/15/2017    Deep vein thrombosis (DVT) of femoral vein of left lower extremity (HCC) 09/15/2017    Dyspnea on exertion     Psychosis         Past Medical History:   Diagnosis Date    Dementia     Hyperlipidemia     Hypertension      History reviewed. No pertinent surgical history. Chart Reviewed: Yes  Patient assessed for rehabilitation services?: Yes  General Comment  Comments: Pt agreeable to PT evaluation. Restrictions:  Restrictions/Precautions: Fall Risk  Body mass index is 33.15 kg/m². SUBJECTIVE: Subjective: \"I don't know why I'm here. \"  Pre Treatment Pain Screening  Pain at present: 0    Post Treatment Pain Screening:   Pain Screening  Patient Currently in Pain: No    Prior Level of Function:  Social/Functional History  Lives With: Alone  Type of Home: Apartment (duplex)  Home Layout: One level  Home Access: Stairs to enter without rails  Entrance Stairs - Number of Steps: 25  ADL Assistance: Independent  Ambulation Assistance: Independent (without AD)  Transfer Assistance: Independent    OBJECTIVE:   Vision/Hearing:  Vision: Within Functional Limits  Hearing: Within functional limits    Cognition:  Overall Orientation Status: Impaired  Orientation Level: Oriented to person  Follows Commands: Within
Pulmonary Disorder  (acute or chronic)  []   Severe or Chronic w/ Exacerbation  []     Surgical Status No [x]   Surgeries     General []   Surgery Lower []   Abdominal Thoracic or []   Upper Abdominal Thoracic with  PulmonaryDisorder  []     Chest X-ray Clear/Not  Ordered     [x]  Chronic Changes  Results Pending  []  Infiltrates, atelectasis, pleural effusion, or edema  []  Infiltrates in more than one lobe []  Infiltrate + Atelectasis, &/or pleural effusion  []    Respiratory Pattern Regular,  RR = 12-20 [x]  Increased,  RR = 21-25 []  BROWN, irregular,  or RR = 26-30 []  Decreased FEV1  or RR = 31-35 []  Severe SOB, use  of accessory muscles, or RR ? 35  []    Mental Status Alert, oriented,  Cooperative []  Confused but Follows commands [x]  Lethargic or unable to follow commands []  Obtunded  []  Comatose  []    Breath Sounds Clear to  auscultation  [x]  Decreased unilaterally or  in bases only []  Decreased  bilaterally  []  Crackles or intermittent wheezes []  Wheezes []    Cough Strong, Spontan., & nonproductive [x]  Strong,  spontaneous, &  productive []  Weak,  Nonproductive []  Weak, productive or  with wheezes []  No spontaneous  cough or may require suctioning []    Level of Activity Ambulatory []  Ambulatory w/ Assist  [x]  Non-ambulatory []  Paraplegic []  Quadriplegic []    Total    Score:___4____     Triage Score:____5____      Tri       Triage:     1. (>20) Freq: Q3    2. (16-20) Freq: Q4   3. (11-15) Freq: QID & Albuterol Q2 PRN    4. (6-10) Freq: TID & Albuterol Q2 PRN    5. (0-5) Freq Q4prn

## 2018-02-23 NOTE — FLOWSHEET NOTE
1730- saline lock removed, telemetry removed to be returned to [address] [place] [address]   Discharge instructions given to patient and his brother, brother stated understanding. 1745- Patient discharged via wheelchair with belongings, instructions and copied materials. Patient did have dried stool on clothes and sheet of bed. Brother brought clean clothes, patient assisted with dressing.

## 2018-02-23 NOTE — ED NOTES
Spoke with [facility] supervisor regarding inability to give report to [address] ok to go to floor per Keshia Gilmore and [facility] questions they will call.      Brian Juarez RN  02/22/18 4122

## 2018-02-27 LAB
BLOOD CULTURE, ROUTINE: NORMAL
CULTURE, BLOOD 2: NORMAL

## 2018-03-27 LAB
BASOPHILS ABSOLUTE: 0.1 K/UL (ref 0–0.2)
BASOPHILS RELATIVE PERCENT: 1.1 %
EOSINOPHILS ABSOLUTE: 1 K/UL (ref 0–0.7)
EOSINOPHILS RELATIVE PERCENT: 14 %
HCT VFR BLD CALC: 31.8 % (ref 42–52)
HEMOGLOBIN: 10.4 G/DL (ref 14–18)
LYMPHOCYTES ABSOLUTE: 2.3 K/UL (ref 1–4.8)
LYMPHOCYTES RELATIVE PERCENT: 32.1 %
MCH RBC QN AUTO: 32 PG (ref 27–31.3)
MCHC RBC AUTO-ENTMCNC: 32.8 % (ref 33–37)
MCV RBC AUTO: 97.6 FL (ref 80–100)
MONOCYTES ABSOLUTE: 0.9 K/UL (ref 0.2–0.8)
MONOCYTES RELATIVE PERCENT: 12.8 %
NEUTROPHILS ABSOLUTE: 2.8 K/UL (ref 1.4–6.5)
NEUTROPHILS RELATIVE PERCENT: 40 %
PDW BLD-RTO: 14.5 % (ref 11.5–14.5)
PLATELET # BLD: 159 K/UL (ref 130–400)
RBC # BLD: 3.26 M/UL (ref 4.7–6.1)
WBC # BLD: 7 K/UL (ref 4.8–10.8)

## 2018-04-05 LAB
ALBUMIN SERPL-MCNC: 3.8 G/DL (ref 3.9–4.9)
ALP BLD-CCNC: 57 U/L (ref 35–104)
ALT SERPL-CCNC: 6 U/L (ref 0–41)
ANION GAP SERPL CALCULATED.3IONS-SCNC: 14 MEQ/L (ref 7–13)
AST SERPL-CCNC: 12 U/L (ref 0–40)
BILIRUB SERPL-MCNC: 0.4 MG/DL (ref 0–1.2)
BUN BLDV-MCNC: 41 MG/DL (ref 8–23)
CALCIUM SERPL-MCNC: 8.8 MG/DL (ref 8.6–10.2)
CHLORIDE BLD-SCNC: 101 MEQ/L (ref 98–107)
CO2: 23 MEQ/L (ref 22–29)
CREAT SERPL-MCNC: 2.04 MG/DL (ref 0.7–1.2)
GFR AFRICAN AMERICAN: 39.6
GFR NON-AFRICAN AMERICAN: 32.7
GLOBULIN: 3.1 G/DL (ref 2.3–3.5)
GLUCOSE BLD-MCNC: 83 MG/DL (ref 74–109)
POTASSIUM SERPL-SCNC: 4.9 MEQ/L (ref 3.5–5.1)
SODIUM BLD-SCNC: 138 MEQ/L (ref 132–144)
TOTAL PROTEIN: 6.9 G/DL (ref 6.4–8.1)

## 2019-01-11 ENCOUNTER — APPOINTMENT (OUTPATIENT)
Dept: CT IMAGING | Age: 69
DRG: 375 | End: 2019-01-11
Payer: MEDICARE

## 2019-01-11 ENCOUNTER — APPOINTMENT (OUTPATIENT)
Dept: GENERAL RADIOLOGY | Age: 69
DRG: 375 | End: 2019-01-11
Payer: MEDICARE

## 2019-01-11 ENCOUNTER — HOSPITAL ENCOUNTER (INPATIENT)
Age: 69
LOS: 7 days | Discharge: HOSPICE | DRG: 375 | End: 2019-01-19
Attending: INTERNAL MEDICINE | Admitting: INTERNAL MEDICINE
Payer: MEDICARE

## 2019-01-11 DIAGNOSIS — R79.89 ELEVATED LACTIC ACID LEVEL: ICD-10-CM

## 2019-01-11 DIAGNOSIS — D64.9 ANEMIA, UNSPECIFIED TYPE: Primary | ICD-10-CM

## 2019-01-11 DIAGNOSIS — K92.2 ACUTE UPPER GI BLEED: ICD-10-CM

## 2019-01-11 LAB
ALBUMIN SERPL-MCNC: 3.4 G/DL (ref 3.9–4.9)
ALP BLD-CCNC: 62 U/L (ref 35–104)
ALT SERPL-CCNC: <5 U/L (ref 0–41)
ANION GAP SERPL CALCULATED.3IONS-SCNC: 13 MEQ/L (ref 7–13)
AST SERPL-CCNC: 26 U/L (ref 0–40)
BILIRUB SERPL-MCNC: <0.2 MG/DL (ref 0–1.2)
BUN BLDV-MCNC: 37 MG/DL (ref 8–23)
CALCIUM SERPL-MCNC: 8.5 MG/DL (ref 8.6–10.2)
CHLORIDE BLD-SCNC: 103 MEQ/L (ref 98–107)
CO2: 24 MEQ/L (ref 22–29)
CREAT SERPL-MCNC: 1.37 MG/DL (ref 0.7–1.2)
EKG ATRIAL RATE: 85 BPM
EKG P AXIS: 40 DEGREES
EKG P-R INTERVAL: 198 MS
EKG Q-T INTERVAL: 364 MS
EKG QRS DURATION: 78 MS
EKG QTC CALCULATION (BAZETT): 433 MS
EKG R AXIS: 51 DEGREES
EKG T AXIS: 30 DEGREES
EKG VENTRICULAR RATE: 85 BPM
GFR AFRICAN AMERICAN: >60
GFR NON-AFRICAN AMERICAN: 51.6
GLOBULIN: 3.7 G/DL (ref 2.3–3.5)
GLUCOSE BLD-MCNC: 116 MG/DL (ref 74–109)
LACTIC ACID: 4.1 MMOL/L (ref 0.5–2.2)
LIPASE: 45 U/L (ref 13–60)
POTASSIUM SERPL-SCNC: 5 MEQ/L (ref 3.5–5.1)
SODIUM BLD-SCNC: 140 MEQ/L (ref 132–144)
TOTAL PROTEIN: 7.1 G/DL (ref 6.4–8.1)
TROPONIN: <0.01 NG/ML (ref 0–0.01)

## 2019-01-11 PROCEDURE — 93005 ELECTROCARDIOGRAM TRACING: CPT

## 2019-01-11 PROCEDURE — 85610 PROTHROMBIN TIME: CPT

## 2019-01-11 PROCEDURE — 99285 EMERGENCY DEPT VISIT HI MDM: CPT

## 2019-01-11 PROCEDURE — 80053 COMPREHEN METABOLIC PANEL: CPT

## 2019-01-11 PROCEDURE — 83605 ASSAY OF LACTIC ACID: CPT

## 2019-01-11 PROCEDURE — 83690 ASSAY OF LIPASE: CPT

## 2019-01-11 PROCEDURE — 86901 BLOOD TYPING SEROLOGIC RH(D): CPT

## 2019-01-11 PROCEDURE — 70450 CT HEAD/BRAIN W/O DYE: CPT

## 2019-01-11 PROCEDURE — 85730 THROMBOPLASTIN TIME PARTIAL: CPT

## 2019-01-11 PROCEDURE — 2580000003 HC RX 258: Performed by: PERSONAL EMERGENCY RESPONSE ATTENDANT

## 2019-01-11 PROCEDURE — 36415 COLL VENOUS BLD VENIPUNCTURE: CPT

## 2019-01-11 PROCEDURE — 86850 RBC ANTIBODY SCREEN: CPT

## 2019-01-11 PROCEDURE — 86900 BLOOD TYPING SEROLOGIC ABO: CPT

## 2019-01-11 PROCEDURE — 84484 ASSAY OF TROPONIN QUANT: CPT

## 2019-01-11 PROCEDURE — 85025 COMPLETE CBC W/AUTO DIFF WBC: CPT

## 2019-01-11 PROCEDURE — 71045 X-RAY EXAM CHEST 1 VIEW: CPT

## 2019-01-11 RX ORDER — 0.9 % SODIUM CHLORIDE 0.9 %
1000 INTRAVENOUS SOLUTION INTRAVENOUS ONCE
Status: COMPLETED | OUTPATIENT
Start: 2019-01-11 | End: 2019-01-12

## 2019-01-11 RX ORDER — LORAZEPAM 2 MG/ML
1 INJECTION INTRAMUSCULAR ONCE
Status: DISCONTINUED | OUTPATIENT
Start: 2019-01-11 | End: 2019-01-11

## 2019-01-11 RX ADMIN — SODIUM CHLORIDE 1000 ML: 9 INJECTION, SOLUTION INTRAVENOUS at 23:43

## 2019-01-11 ASSESSMENT — ENCOUNTER SYMPTOMS
ABDOMINAL PAIN: 0
DIARRHEA: 0
COLOR CHANGE: 0
COUGH: 0
SORE THROAT: 0
SHORTNESS OF BREATH: 0
VOMITING: 0
NAUSEA: 0
BLOOD IN STOOL: 0
RHINORRHEA: 0

## 2019-01-12 PROBLEM — D50.0 ANEMIA DUE TO GI BLOOD LOSS: Status: ACTIVE | Noted: 2019-01-12

## 2019-01-12 LAB
ABO/RH: NORMAL
ANTIBODY SCREEN: NORMAL
APTT: 22.7 SEC (ref 21.6–35.4)
BASOPHILS ABSOLUTE: 0 K/UL (ref 0–0.2)
BASOPHILS ABSOLUTE: 0.1 K/UL (ref 0–0.2)
BASOPHILS RELATIVE PERCENT: 0.5 %
BASOPHILS RELATIVE PERCENT: 0.5 %
BLOOD BANK DISPENSE STATUS: NORMAL
BLOOD BANK PRODUCT CODE: NORMAL
BPU ID: NORMAL
DESCRIPTION BLOOD BANK: NORMAL
EOSINOPHILS ABSOLUTE: 0.1 K/UL (ref 0–0.7)
EOSINOPHILS ABSOLUTE: 0.1 K/UL (ref 0–0.7)
EOSINOPHILS RELATIVE PERCENT: 1 %
EOSINOPHILS RELATIVE PERCENT: 1.8 %
HCT VFR BLD CALC: 15.5 % (ref 42–52)
HCT VFR BLD CALC: 18.7 % (ref 42–52)
HEMOGLOBIN: 5 G/DL (ref 14–18)
HEMOGLOBIN: 6 G/DL (ref 14–18)
INR BLD: 1
LACTIC ACID: 0.8 MMOL/L (ref 0.5–2.2)
LYMPHOCYTES ABSOLUTE: 1 K/UL (ref 1–4.8)
LYMPHOCYTES ABSOLUTE: 1.3 K/UL (ref 1–4.8)
LYMPHOCYTES RELATIVE PERCENT: 10.9 %
LYMPHOCYTES RELATIVE PERCENT: 16.1 %
MCH RBC QN AUTO: 31 PG (ref 27–31.3)
MCH RBC QN AUTO: 31.9 PG (ref 27–31.3)
MCHC RBC AUTO-ENTMCNC: 32 % (ref 33–37)
MCHC RBC AUTO-ENTMCNC: 32.3 % (ref 33–37)
MCV RBC AUTO: 96 FL (ref 80–100)
MCV RBC AUTO: 99.6 FL (ref 80–100)
MONOCYTES ABSOLUTE: 0.8 K/UL (ref 0.2–0.8)
MONOCYTES ABSOLUTE: 0.9 K/UL (ref 0.2–0.8)
MONOCYTES RELATIVE PERCENT: 10.5 %
MONOCYTES RELATIVE PERCENT: 8.1 %
NEUTROPHILS ABSOLUTE: 5.9 K/UL (ref 1.4–6.5)
NEUTROPHILS ABSOLUTE: 7.6 K/UL (ref 1.4–6.5)
NEUTROPHILS RELATIVE PERCENT: 71.1 %
NEUTROPHILS RELATIVE PERCENT: 79.5 %
PDW BLD-RTO: 16.9 % (ref 11.5–14.5)
PDW BLD-RTO: 18.5 % (ref 11.5–14.5)
PLATELET # BLD: 208 K/UL (ref 130–400)
PLATELET # BLD: 219 K/UL (ref 130–400)
PROTHROMBIN TIME: 10.6 SEC (ref 9–11.5)
RBC # BLD: 1.56 M/UL (ref 4.7–6.1)
RBC # BLD: 1.95 M/UL (ref 4.7–6.1)
WBC # BLD: 8.3 K/UL (ref 4.8–10.8)
WBC # BLD: 9.5 K/UL (ref 4.8–10.8)

## 2019-01-12 PROCEDURE — 2580000003 HC RX 258: Performed by: INTERNAL MEDICINE

## 2019-01-12 PROCEDURE — 83605 ASSAY OF LACTIC ACID: CPT

## 2019-01-12 PROCEDURE — 2580000003 HC RX 258: Performed by: PERSONAL EMERGENCY RESPONSE ATTENDANT

## 2019-01-12 PROCEDURE — 36415 COLL VENOUS BLD VENIPUNCTURE: CPT

## 2019-01-12 PROCEDURE — P9016 RBC LEUKOCYTES REDUCED: HCPCS

## 2019-01-12 PROCEDURE — 6360000002 HC RX W HCPCS: Performed by: INTERNAL MEDICINE

## 2019-01-12 PROCEDURE — C9113 INJ PANTOPRAZOLE SODIUM, VIA: HCPCS | Performed by: PERSONAL EMERGENCY RESPONSE ATTENDANT

## 2019-01-12 PROCEDURE — 6360000002 HC RX W HCPCS: Performed by: PERSONAL EMERGENCY RESPONSE ATTENDANT

## 2019-01-12 PROCEDURE — 86923 COMPATIBILITY TEST ELECTRIC: CPT

## 2019-01-12 PROCEDURE — 94664 DEMO&/EVAL PT USE INHALER: CPT

## 2019-01-12 PROCEDURE — 1210000000 HC MED SURG R&B

## 2019-01-12 PROCEDURE — 6370000000 HC RX 637 (ALT 250 FOR IP): Performed by: SPECIALIST

## 2019-01-12 PROCEDURE — C9113 INJ PANTOPRAZOLE SODIUM, VIA: HCPCS | Performed by: INTERNAL MEDICINE

## 2019-01-12 PROCEDURE — 36430 TRANSFUSION BLD/BLD COMPNT: CPT

## 2019-01-12 PROCEDURE — 85025 COMPLETE CBC W/AUTO DIFF WBC: CPT

## 2019-01-12 RX ORDER — PRAVASTATIN SODIUM 40 MG
40 TABLET ORAL DAILY
Status: DISCONTINUED | OUTPATIENT
Start: 2019-01-13 | End: 2019-01-19 | Stop reason: HOSPADM

## 2019-01-12 RX ORDER — FOLIC ACID 1 MG/1
1 TABLET ORAL DAILY
Status: DISCONTINUED | OUTPATIENT
Start: 2019-01-13 | End: 2019-01-19 | Stop reason: HOSPADM

## 2019-01-12 RX ORDER — ONDANSETRON 4 MG/1
4 TABLET, FILM COATED ORAL EVERY 6 HOURS PRN
COMMUNITY

## 2019-01-12 RX ORDER — AMANTADINE HYDROCHLORIDE 100 MG/1
100 CAPSULE, GELATIN COATED ORAL 2 TIMES DAILY
COMMUNITY

## 2019-01-12 RX ORDER — DONEPEZIL HYDROCHLORIDE 10 MG/1
10 TABLET, FILM COATED ORAL 2 TIMES DAILY
Status: DISCONTINUED | OUTPATIENT
Start: 2019-01-12 | End: 2019-01-19 | Stop reason: HOSPADM

## 2019-01-12 RX ORDER — 0.9 % SODIUM CHLORIDE 0.9 %
250 INTRAVENOUS SOLUTION INTRAVENOUS ONCE
Status: COMPLETED | OUTPATIENT
Start: 2019-01-12 | End: 2019-01-12

## 2019-01-12 RX ORDER — RIVASTIGMINE 9.5 MG/24H
1 PATCH, EXTENDED RELEASE TRANSDERMAL DAILY
COMMUNITY

## 2019-01-12 RX ORDER — 0.9 % SODIUM CHLORIDE 0.9 %
10 VIAL (ML) INJECTION PRN
Status: DISCONTINUED | OUTPATIENT
Start: 2019-01-12 | End: 2019-01-12 | Stop reason: SDUPTHER

## 2019-01-12 RX ORDER — 0.9 % SODIUM CHLORIDE 0.9 %
10 VIAL (ML) INJECTION DAILY
Status: DISCONTINUED | OUTPATIENT
Start: 2019-01-12 | End: 2019-01-12 | Stop reason: HOSPADM

## 2019-01-12 RX ORDER — PANTOPRAZOLE SODIUM 40 MG/10ML
40 INJECTION, POWDER, LYOPHILIZED, FOR SOLUTION INTRAVENOUS ONCE
Status: COMPLETED | OUTPATIENT
Start: 2019-01-12 | End: 2019-01-12

## 2019-01-12 RX ORDER — RIVASTIGMINE 9.5 MG/24H
1 PATCH, EXTENDED RELEASE TRANSDERMAL DAILY
Status: DISCONTINUED | OUTPATIENT
Start: 2019-01-13 | End: 2019-01-19 | Stop reason: HOSPADM

## 2019-01-12 RX ORDER — 0.9 % SODIUM CHLORIDE 0.9 %
10 VIAL (ML) INJECTION EVERY 12 HOURS SCHEDULED
Status: DISCONTINUED | OUTPATIENT
Start: 2019-01-12 | End: 2019-01-19 | Stop reason: HOSPADM

## 2019-01-12 RX ORDER — LACTULOSE 10 G/15ML
20 SOLUTION ORAL; RECTAL 3 TIMES DAILY
Status: ON HOLD | COMMUNITY
End: 2019-01-19 | Stop reason: HOSPADM

## 2019-01-12 RX ORDER — SODIUM CHLORIDE 9 MG/ML
INJECTION, SOLUTION INTRAVENOUS ONCE
Status: COMPLETED | OUTPATIENT
Start: 2019-01-12 | End: 2019-01-12

## 2019-01-12 RX ORDER — DIVALPROEX SODIUM 250 MG/1
250 TABLET, EXTENDED RELEASE ORAL 2 TIMES DAILY
Status: DISCONTINUED | OUTPATIENT
Start: 2019-01-12 | End: 2019-01-19 | Stop reason: HOSPADM

## 2019-01-12 RX ORDER — SODIUM PHOSPHATE, DIBASIC AND SODIUM PHOSPHATE, MONOBASIC 7; 19 G/133ML; G/133ML
1 ENEMA RECTAL EVERY 24 HOURS
COMMUNITY

## 2019-01-12 RX ORDER — RISPERIDONE 2 MG/1
2 TABLET, FILM COATED ORAL NIGHTLY
Status: DISCONTINUED | OUTPATIENT
Start: 2019-01-12 | End: 2019-01-15

## 2019-01-12 RX ORDER — BISACODYL 10 MG
10 SUPPOSITORY, RECTAL RECTAL DAILY
COMMUNITY

## 2019-01-12 RX ORDER — PANTOPRAZOLE SODIUM 40 MG/10ML
40 INJECTION, POWDER, LYOPHILIZED, FOR SOLUTION INTRAVENOUS EVERY 12 HOURS
Status: DISCONTINUED | OUTPATIENT
Start: 2019-01-12 | End: 2019-01-19 | Stop reason: HOSPADM

## 2019-01-12 RX ORDER — SODIUM CHLORIDE 0.9 % (FLUSH) 0.9 %
10 SYRINGE (ML) INJECTION PRN
Status: DISCONTINUED | OUTPATIENT
Start: 2019-01-12 | End: 2019-01-19 | Stop reason: HOSPADM

## 2019-01-12 RX ORDER — 0.9 % SODIUM CHLORIDE 0.9 %
10 VIAL (ML) INJECTION EVERY 12 HOURS SCHEDULED
Status: DISCONTINUED | OUTPATIENT
Start: 2019-01-12 | End: 2019-01-12 | Stop reason: SDUPTHER

## 2019-01-12 RX ORDER — ONDANSETRON 2 MG/ML
4 INJECTION INTRAMUSCULAR; INTRAVENOUS EVERY 6 HOURS PRN
Status: DISCONTINUED | OUTPATIENT
Start: 2019-01-12 | End: 2019-01-19 | Stop reason: HOSPADM

## 2019-01-12 RX ORDER — SODIUM CHLORIDE 0.9 % (FLUSH) 0.9 %
10 SYRINGE (ML) INJECTION EVERY 12 HOURS SCHEDULED
Status: DISCONTINUED | OUTPATIENT
Start: 2019-01-12 | End: 2019-01-19 | Stop reason: HOSPADM

## 2019-01-12 RX ORDER — AMANTADINE HYDROCHLORIDE 100 MG/1
100 CAPSULE, GELATIN COATED ORAL 2 TIMES DAILY
Status: DISCONTINUED | OUTPATIENT
Start: 2019-01-12 | End: 2019-01-19 | Stop reason: HOSPADM

## 2019-01-12 RX ORDER — 0.9 % SODIUM CHLORIDE 0.9 %
10 VIAL (ML) INJECTION PRN
Status: DISCONTINUED | OUTPATIENT
Start: 2019-01-12 | End: 2019-01-19 | Stop reason: HOSPADM

## 2019-01-12 RX ORDER — IPRATROPIUM BROMIDE AND ALBUTEROL SULFATE 2.5; .5 MG/3ML; MG/3ML
3 SOLUTION RESPIRATORY (INHALATION) EVERY 4 HOURS PRN
Status: DISCONTINUED | OUTPATIENT
Start: 2019-01-12 | End: 2019-01-19 | Stop reason: HOSPADM

## 2019-01-12 RX ORDER — SODIUM CHLORIDE 9 MG/ML
INJECTION, SOLUTION INTRAVENOUS ONCE
Status: DISCONTINUED | OUTPATIENT
Start: 2019-01-12 | End: 2019-01-19 | Stop reason: HOSPADM

## 2019-01-12 RX ORDER — DOCUSATE SODIUM 100 MG/1
100 CAPSULE, LIQUID FILLED ORAL 2 TIMES DAILY
COMMUNITY

## 2019-01-12 RX ORDER — ACETAMINOPHEN 650 MG/1
650 SUPPOSITORY RECTAL EVERY 4 HOURS PRN
COMMUNITY

## 2019-01-12 RX ORDER — 0.9 % SODIUM CHLORIDE 0.9 %
10 VIAL (ML) INJECTION EVERY 12 HOURS
Status: DISCONTINUED | OUTPATIENT
Start: 2019-01-12 | End: 2019-01-19 | Stop reason: HOSPADM

## 2019-01-12 RX ORDER — GUAIFENESIN 600 MG/1
1200 TABLET, EXTENDED RELEASE ORAL EVERY 12 HOURS PRN
COMMUNITY

## 2019-01-12 RX ORDER — ACETAMINOPHEN 325 MG/1
650 TABLET ORAL EVERY 4 HOURS PRN
Status: DISCONTINUED | OUTPATIENT
Start: 2019-01-12 | End: 2019-01-19 | Stop reason: HOSPADM

## 2019-01-12 RX ADMIN — SODIUM CHLORIDE 250 ML: 9 INJECTION, SOLUTION INTRAVENOUS at 14:42

## 2019-01-12 RX ADMIN — SODIUM CHLORIDE 1000 ML: 9 INJECTION, SOLUTION INTRAVENOUS at 00:09

## 2019-01-12 RX ADMIN — POLYETHYLENE GLYCOL-3350 AND ELECTROLYTES 4000 ML: 236; 6.74; 5.86; 2.97; 22.74 POWDER, FOR SOLUTION ORAL at 19:45

## 2019-01-12 RX ADMIN — Medication 10 ML: at 11:03

## 2019-01-12 RX ADMIN — SODIUM CHLORIDE: 9 INJECTION, SOLUTION INTRAVENOUS at 06:32

## 2019-01-12 RX ADMIN — PANTOPRAZOLE SODIUM 40 MG: 40 INJECTION, POWDER, FOR SOLUTION INTRAVENOUS at 16:25

## 2019-01-12 RX ADMIN — SODIUM CHLORIDE 250 ML: 9 INJECTION, SOLUTION INTRAVENOUS at 01:04

## 2019-01-12 RX ADMIN — ONDANSETRON 4 MG: 2 INJECTION INTRAMUSCULAR; INTRAVENOUS at 19:45

## 2019-01-12 RX ADMIN — PANTOPRAZOLE SODIUM 40 MG: 40 INJECTION, POWDER, FOR SOLUTION INTRAVENOUS at 01:04

## 2019-01-12 RX ADMIN — Medication 10 ML: at 16:26

## 2019-01-12 RX ADMIN — Medication 10 ML: at 20:26

## 2019-01-12 ASSESSMENT — PAIN SCALES - GENERAL: PAINLEVEL_OUTOF10: 0

## 2019-01-13 ENCOUNTER — ANESTHESIA EVENT (OUTPATIENT)
Dept: OPERATING ROOM | Age: 69
DRG: 375 | End: 2019-01-13
Payer: MEDICARE

## 2019-01-13 ENCOUNTER — ANESTHESIA (OUTPATIENT)
Dept: OPERATING ROOM | Age: 69
DRG: 375 | End: 2019-01-13
Payer: MEDICARE

## 2019-01-13 VITALS
SYSTOLIC BLOOD PRESSURE: 202 MMHG | DIASTOLIC BLOOD PRESSURE: 104 MMHG | OXYGEN SATURATION: 99 % | RESPIRATION RATE: 37 BRPM

## 2019-01-13 LAB
ALBUMIN SERPL-MCNC: 2.7 G/DL (ref 3.9–4.9)
ANION GAP SERPL CALCULATED.3IONS-SCNC: 14 MEQ/L (ref 7–13)
BUN BLDV-MCNC: 16 MG/DL (ref 8–23)
CALCIUM SERPL-MCNC: 8.1 MG/DL (ref 8.6–10.2)
CHLORIDE BLD-SCNC: 107 MEQ/L (ref 98–107)
CO2: 21 MEQ/L (ref 22–29)
CREAT SERPL-MCNC: 0.95 MG/DL (ref 0.7–1.2)
GFR AFRICAN AMERICAN: >60
GFR NON-AFRICAN AMERICAN: >60
GLUCOSE BLD-MCNC: 93 MG/DL (ref 74–109)
HCT VFR BLD CALC: 23.7 % (ref 42–52)
HEMOGLOBIN: 7.9 G/DL (ref 14–18)
MAGNESIUM: 2.2 MG/DL (ref 1.7–2.3)
MCH RBC QN AUTO: 30.9 PG (ref 27–31.3)
MCHC RBC AUTO-ENTMCNC: 33.3 % (ref 33–37)
MCV RBC AUTO: 92.8 FL (ref 80–100)
PDW BLD-RTO: 17.6 % (ref 11.5–14.5)
PHOSPHORUS: 2.5 MG/DL (ref 2.5–4.5)
PLATELET # BLD: 235 K/UL (ref 130–400)
POTASSIUM SERPL-SCNC: 4.2 MEQ/L (ref 3.5–5.1)
RBC # BLD: 2.55 M/UL (ref 4.7–6.1)
SODIUM BLD-SCNC: 142 MEQ/L (ref 132–144)
WBC # BLD: 8.4 K/UL (ref 4.8–10.8)

## 2019-01-13 PROCEDURE — 2700000000 HC OXYGEN THERAPY PER DAY

## 2019-01-13 PROCEDURE — 6370000000 HC RX 637 (ALT 250 FOR IP): Performed by: INTERNAL MEDICINE

## 2019-01-13 PROCEDURE — 80069 RENAL FUNCTION PANEL: CPT

## 2019-01-13 PROCEDURE — 36415 COLL VENOUS BLD VENIPUNCTURE: CPT

## 2019-01-13 PROCEDURE — 6360000002 HC RX W HCPCS: Performed by: INTERNAL MEDICINE

## 2019-01-13 PROCEDURE — 2580000003 HC RX 258: Performed by: SPECIALIST

## 2019-01-13 PROCEDURE — 2580000003 HC RX 258: Performed by: ANESTHESIOLOGY

## 2019-01-13 PROCEDURE — 3700000000 HC ANESTHESIA ATTENDED CARE: Performed by: SPECIALIST

## 2019-01-13 PROCEDURE — 3609012400 HC EGD TRANSORAL BIOPSY SINGLE/MULTIPLE: Performed by: SPECIALIST

## 2019-01-13 PROCEDURE — 88305 TISSUE EXAM BY PATHOLOGIST: CPT

## 2019-01-13 PROCEDURE — 7100000000 HC PACU RECOVERY - FIRST 15 MIN: Performed by: SPECIALIST

## 2019-01-13 PROCEDURE — C9113 INJ PANTOPRAZOLE SODIUM, VIA: HCPCS | Performed by: INTERNAL MEDICINE

## 2019-01-13 PROCEDURE — 1210000000 HC MED SURG R&B

## 2019-01-13 PROCEDURE — 2580000003 HC RX 258: Performed by: INTERNAL MEDICINE

## 2019-01-13 PROCEDURE — 83735 ASSAY OF MAGNESIUM: CPT

## 2019-01-13 PROCEDURE — 3700000001 HC ADD 15 MINUTES (ANESTHESIA): Performed by: SPECIALIST

## 2019-01-13 PROCEDURE — 0DB58ZX EXCISION OF ESOPHAGUS, VIA NATURAL OR ARTIFICIAL OPENING ENDOSCOPIC, DIAGNOSTIC: ICD-10-PCS | Performed by: SPECIALIST

## 2019-01-13 PROCEDURE — 88313 SPECIAL STAINS GROUP 2: CPT

## 2019-01-13 PROCEDURE — 7100000001 HC PACU RECOVERY - ADDTL 15 MIN: Performed by: SPECIALIST

## 2019-01-13 PROCEDURE — 6360000002 HC RX W HCPCS: Performed by: ANESTHESIOLOGY

## 2019-01-13 PROCEDURE — 85027 COMPLETE CBC AUTOMATED: CPT

## 2019-01-13 RX ORDER — SODIUM CHLORIDE 9 MG/ML
INJECTION, SOLUTION INTRAVENOUS CONTINUOUS PRN
Status: DISCONTINUED | OUTPATIENT
Start: 2019-01-13 | End: 2019-01-13 | Stop reason: SDUPTHER

## 2019-01-13 RX ORDER — SODIUM CHLORIDE 9 MG/ML
INJECTION, SOLUTION INTRAVENOUS CONTINUOUS
Status: DISCONTINUED | OUTPATIENT
Start: 2019-01-13 | End: 2019-01-14

## 2019-01-13 RX ORDER — ONDANSETRON 2 MG/ML
4 INJECTION INTRAMUSCULAR; INTRAVENOUS
Status: DISCONTINUED | OUTPATIENT
Start: 2019-01-13 | End: 2019-01-13 | Stop reason: HOSPADM

## 2019-01-13 RX ORDER — MAGNESIUM HYDROXIDE 1200 MG/15ML
LIQUID ORAL CONTINUOUS PRN
Status: COMPLETED | OUTPATIENT
Start: 2019-01-13 | End: 2019-01-13

## 2019-01-13 RX ORDER — MAGNESIUM HYDROXIDE 1200 MG/15ML
LIQUID ORAL PRN
Status: DISCONTINUED | OUTPATIENT
Start: 2019-01-13 | End: 2019-01-13 | Stop reason: HOSPADM

## 2019-01-13 RX ORDER — PROPOFOL 10 MG/ML
INJECTION, EMULSION INTRAVENOUS PRN
Status: DISCONTINUED | OUTPATIENT
Start: 2019-01-13 | End: 2019-01-13 | Stop reason: SDUPTHER

## 2019-01-13 RX ADMIN — SODIUM CHLORIDE: 9 INJECTION, SOLUTION INTRAVENOUS at 17:15

## 2019-01-13 RX ADMIN — Medication 10 ML: at 17:15

## 2019-01-13 RX ADMIN — PROPOFOL 20 MG: 10 INJECTION, EMULSION INTRAVENOUS at 15:14

## 2019-01-13 RX ADMIN — PANTOPRAZOLE SODIUM 40 MG: 40 INJECTION, POWDER, FOR SOLUTION INTRAVENOUS at 17:14

## 2019-01-13 RX ADMIN — Medication 10 ML: at 09:51

## 2019-01-13 RX ADMIN — Medication 10 ML: at 09:49

## 2019-01-13 RX ADMIN — Medication 10 ML: at 05:01

## 2019-01-13 RX ADMIN — SODIUM CHLORIDE: 900 INJECTION INTRAVENOUS at 15:04

## 2019-01-13 RX ADMIN — PROPOFOL 40 MG: 10 INJECTION, EMULSION INTRAVENOUS at 15:19

## 2019-01-13 RX ADMIN — PROPOFOL 50 MG: 10 INJECTION, EMULSION INTRAVENOUS at 15:11

## 2019-01-13 RX ADMIN — PANTOPRAZOLE SODIUM 40 MG: 40 INJECTION, POWDER, FOR SOLUTION INTRAVENOUS at 05:01

## 2019-01-13 ASSESSMENT — PULMONARY FUNCTION TESTS
PIF_VALUE: 0
PIF_VALUE: 1
PIF_VALUE: 0

## 2019-01-13 ASSESSMENT — PAIN SCALES - GENERAL
PAINLEVEL_OUTOF10: 0

## 2019-01-13 ASSESSMENT — ENCOUNTER SYMPTOMS: SHORTNESS OF BREATH: 1

## 2019-01-13 ASSESSMENT — COPD QUESTIONNAIRES: CAT_SEVERITY: MODERATE

## 2019-01-14 ENCOUNTER — APPOINTMENT (OUTPATIENT)
Dept: CT IMAGING | Age: 69
DRG: 375 | End: 2019-01-14
Payer: MEDICARE

## 2019-01-14 LAB
ALBUMIN SERPL-MCNC: 2.8 G/DL (ref 3.9–4.9)
ANION GAP SERPL CALCULATED.3IONS-SCNC: 14 MEQ/L (ref 7–13)
BUN BLDV-MCNC: 16 MG/DL (ref 8–23)
CALCIUM SERPL-MCNC: 8.3 MG/DL (ref 8.6–10.2)
CHLORIDE BLD-SCNC: 106 MEQ/L (ref 98–107)
CO2: 22 MEQ/L (ref 22–29)
CREAT SERPL-MCNC: 0.76 MG/DL (ref 0.7–1.2)
GFR AFRICAN AMERICAN: >60
GFR NON-AFRICAN AMERICAN: >60
GLUCOSE BLD-MCNC: 81 MG/DL (ref 74–109)
HCT VFR BLD CALC: 21.8 % (ref 42–52)
HEMOGLOBIN: 7.2 G/DL (ref 14–18)
MAGNESIUM: 2.2 MG/DL (ref 1.7–2.3)
MCH RBC QN AUTO: 30.9 PG (ref 27–31.3)
MCHC RBC AUTO-ENTMCNC: 33 % (ref 33–37)
MCV RBC AUTO: 93.6 FL (ref 80–100)
PDW BLD-RTO: 17.6 % (ref 11.5–14.5)
PHOSPHORUS: 3.2 MG/DL (ref 2.5–4.5)
PLATELET # BLD: 273 K/UL (ref 130–400)
PLATELET SLIDE REVIEW: ADEQUATE
POTASSIUM SERPL-SCNC: 4.5 MEQ/L (ref 3.5–5.1)
RBC # BLD: 2.33 M/UL (ref 4.7–6.1)
SLIDE REVIEW: ABNORMAL
SODIUM BLD-SCNC: 142 MEQ/L (ref 132–144)
WBC # BLD: 9.7 K/UL (ref 4.8–10.8)

## 2019-01-14 PROCEDURE — C9113 INJ PANTOPRAZOLE SODIUM, VIA: HCPCS | Performed by: INTERNAL MEDICINE

## 2019-01-14 PROCEDURE — G8996 SWALLOW CURRENT STATUS: HCPCS

## 2019-01-14 PROCEDURE — 6370000000 HC RX 637 (ALT 250 FOR IP): Performed by: INTERNAL MEDICINE

## 2019-01-14 PROCEDURE — 2580000003 HC RX 258: Performed by: INTERNAL MEDICINE

## 2019-01-14 PROCEDURE — 6360000004 HC RX CONTRAST MEDICATION: Performed by: SPECIALIST

## 2019-01-14 PROCEDURE — 93010 ELECTROCARDIOGRAM REPORT: CPT | Performed by: INTERNAL MEDICINE

## 2019-01-14 PROCEDURE — 36415 COLL VENOUS BLD VENIPUNCTURE: CPT

## 2019-01-14 PROCEDURE — 92610 EVALUATE SWALLOWING FUNCTION: CPT

## 2019-01-14 PROCEDURE — 83735 ASSAY OF MAGNESIUM: CPT

## 2019-01-14 PROCEDURE — 6360000002 HC RX W HCPCS: Performed by: INTERNAL MEDICINE

## 2019-01-14 PROCEDURE — 85027 COMPLETE CBC AUTOMATED: CPT

## 2019-01-14 PROCEDURE — 2700000000 HC OXYGEN THERAPY PER DAY

## 2019-01-14 PROCEDURE — 1210000000 HC MED SURG R&B

## 2019-01-14 PROCEDURE — 74177 CT ABD & PELVIS W/CONTRAST: CPT

## 2019-01-14 PROCEDURE — G8997 SWALLOW GOAL STATUS: HCPCS

## 2019-01-14 PROCEDURE — 80069 RENAL FUNCTION PANEL: CPT

## 2019-01-14 PROCEDURE — 71260 CT THORAX DX C+: CPT

## 2019-01-14 RX ADMIN — DIATRIZOATE MEGLUMINE AND DIATRIZOATE SODIUM 30 ML: 600; 100 SOLUTION ORAL; RECTAL at 09:04

## 2019-01-14 RX ADMIN — Medication 10 ML: at 03:39

## 2019-01-14 RX ADMIN — Medication 10 ML: at 17:47

## 2019-01-14 RX ADMIN — SODIUM CHLORIDE: 9 INJECTION, SOLUTION INTRAVENOUS at 03:39

## 2019-01-14 RX ADMIN — PANTOPRAZOLE SODIUM 40 MG: 40 INJECTION, POWDER, FOR SOLUTION INTRAVENOUS at 17:47

## 2019-01-14 RX ADMIN — PANTOPRAZOLE SODIUM 40 MG: 40 INJECTION, POWDER, FOR SOLUTION INTRAVENOUS at 03:39

## 2019-01-14 RX ADMIN — IOPAMIDOL 100 ML: 612 INJECTION, SOLUTION INTRAVENOUS at 09:04

## 2019-01-14 ASSESSMENT — PAIN SCALES - GENERAL: PAINLEVEL_OUTOF10: 0

## 2019-01-15 PROBLEM — F03.918 DEMENTIA WITH BEHAVIORAL DISTURBANCE: Status: ACTIVE | Noted: 2019-01-15

## 2019-01-15 LAB
ALBUMIN SERPL-MCNC: 2.8 G/DL (ref 3.9–4.9)
ANION GAP SERPL CALCULATED.3IONS-SCNC: 12 MEQ/L (ref 7–13)
BILIRUBIN URINE: NEGATIVE
BLOOD, URINE: NEGATIVE
BUN BLDV-MCNC: 19 MG/DL (ref 8–23)
CALCIUM SERPL-MCNC: 8.3 MG/DL (ref 8.6–10.2)
CHLORIDE BLD-SCNC: 106 MEQ/L (ref 98–107)
CLARITY: CLEAR
CO2: 25 MEQ/L (ref 22–29)
COLOR: YELLOW
CREAT SERPL-MCNC: 0.81 MG/DL (ref 0.7–1.2)
GFR AFRICAN AMERICAN: >60
GFR NON-AFRICAN AMERICAN: >60
GLUCOSE BLD-MCNC: 85 MG/DL (ref 74–109)
GLUCOSE URINE: NEGATIVE MG/DL
HCT VFR BLD CALC: 23 % (ref 42–52)
HEMOGLOBIN: 7.5 G/DL (ref 14–18)
KETONES, URINE: ABNORMAL MG/DL
LEUKOCYTE ESTERASE, URINE: NEGATIVE
MAGNESIUM: 2.1 MG/DL (ref 1.7–2.3)
MCH RBC QN AUTO: 30.4 PG (ref 27–31.3)
MCHC RBC AUTO-ENTMCNC: 32.5 % (ref 33–37)
MCV RBC AUTO: 93.4 FL (ref 80–100)
NITRITE, URINE: NEGATIVE
PDW BLD-RTO: 16.8 % (ref 11.5–14.5)
PH UA: 5 (ref 5–9)
PHOSPHORUS: 3 MG/DL (ref 2.5–4.5)
PLATELET # BLD: 273 K/UL (ref 130–400)
POTASSIUM SERPL-SCNC: 4 MEQ/L (ref 3.5–5.1)
PROTEIN UA: NEGATIVE MG/DL
RBC # BLD: 2.47 M/UL (ref 4.7–6.1)
SODIUM BLD-SCNC: 143 MEQ/L (ref 132–144)
SPECIFIC GRAVITY UA: 1.02 (ref 1–1.03)
URINE REFLEX TO CULTURE: ABNORMAL
UROBILINOGEN, URINE: 1 E.U./DL
WBC # BLD: 7.5 K/UL (ref 4.8–10.8)

## 2019-01-15 PROCEDURE — 81003 URINALYSIS AUTO W/O SCOPE: CPT

## 2019-01-15 PROCEDURE — 2580000003 HC RX 258: Performed by: SPECIALIST

## 2019-01-15 PROCEDURE — 80069 RENAL FUNCTION PANEL: CPT

## 2019-01-15 PROCEDURE — 90791 PSYCH DIAGNOSTIC EVALUATION: CPT | Performed by: PSYCHIATRY & NEUROLOGY

## 2019-01-15 PROCEDURE — C9113 INJ PANTOPRAZOLE SODIUM, VIA: HCPCS | Performed by: INTERNAL MEDICINE

## 2019-01-15 PROCEDURE — 51701 INSERT BLADDER CATHETER: CPT

## 2019-01-15 PROCEDURE — 83735 ASSAY OF MAGNESIUM: CPT

## 2019-01-15 PROCEDURE — 6370000000 HC RX 637 (ALT 250 FOR IP): Performed by: PSYCHIATRY & NEUROLOGY

## 2019-01-15 PROCEDURE — 36415 COLL VENOUS BLD VENIPUNCTURE: CPT

## 2019-01-15 PROCEDURE — 6360000002 HC RX W HCPCS: Performed by: INTERNAL MEDICINE

## 2019-01-15 PROCEDURE — 1210000000 HC MED SURG R&B

## 2019-01-15 PROCEDURE — 6370000000 HC RX 637 (ALT 250 FOR IP): Performed by: INTERNAL MEDICINE

## 2019-01-15 PROCEDURE — 99223 1ST HOSP IP/OBS HIGH 75: CPT | Performed by: INTERNAL MEDICINE

## 2019-01-15 PROCEDURE — 85027 COMPLETE CBC AUTOMATED: CPT

## 2019-01-15 PROCEDURE — 2580000003 HC RX 258: Performed by: INTERNAL MEDICINE

## 2019-01-15 RX ORDER — HALOPERIDOL 5 MG/ML
2 INJECTION INTRAMUSCULAR ONCE
Status: COMPLETED | OUTPATIENT
Start: 2019-01-15 | End: 2019-01-15

## 2019-01-15 RX ORDER — QUETIAPINE FUMARATE 25 MG/1
25 TABLET, FILM COATED ORAL 3 TIMES DAILY
Status: DISCONTINUED | OUTPATIENT
Start: 2019-01-15 | End: 2019-01-19 | Stop reason: HOSPADM

## 2019-01-15 RX ADMIN — SERTRALINE HYDROCHLORIDE 50 MG: 50 TABLET ORAL at 11:48

## 2019-01-15 RX ADMIN — AMANTADINE HYDROCHLORIDE 100 MG: 100 CAPSULE ORAL at 11:47

## 2019-01-15 RX ADMIN — CARBIDOPA AND LEVODOPA 1 TABLET: 25; 100 TABLET ORAL at 11:48

## 2019-01-15 RX ADMIN — DIVALPROEX SODIUM 250 MG: 250 TABLET, EXTENDED RELEASE ORAL at 21:16

## 2019-01-15 RX ADMIN — PANTOPRAZOLE SODIUM 40 MG: 40 INJECTION, POWDER, FOR SOLUTION INTRAVENOUS at 21:15

## 2019-01-15 RX ADMIN — QUETIAPINE FUMARATE 25 MG: 25 TABLET ORAL at 21:16

## 2019-01-15 RX ADMIN — Medication 10 ML: at 21:23

## 2019-01-15 RX ADMIN — CARBIDOPA AND LEVODOPA 1 TABLET: 25; 100 TABLET ORAL at 21:16

## 2019-01-15 RX ADMIN — PANTOPRAZOLE SODIUM 40 MG: 40 INJECTION, POWDER, FOR SOLUTION INTRAVENOUS at 08:06

## 2019-01-15 RX ADMIN — Medication 10 ML: at 21:16

## 2019-01-15 RX ADMIN — QUETIAPINE FUMARATE 25 MG: 25 TABLET ORAL at 13:59

## 2019-01-15 RX ADMIN — FOLIC ACID 1 MG: 1 TABLET ORAL at 11:48

## 2019-01-15 RX ADMIN — AMANTADINE HYDROCHLORIDE 100 MG: 100 CAPSULE ORAL at 21:16

## 2019-01-15 RX ADMIN — Medication 10 ML: at 08:06

## 2019-01-15 RX ADMIN — DIVALPROEX SODIUM 250 MG: 250 TABLET, EXTENDED RELEASE ORAL at 11:48

## 2019-01-15 RX ADMIN — HALOPERIDOL LACTATE 2 MG: 5 INJECTION, SOLUTION INTRAMUSCULAR at 10:38

## 2019-01-15 RX ADMIN — DONEPEZIL HYDROCHLORIDE 10 MG: 10 TABLET, FILM COATED ORAL at 21:16

## 2019-01-15 RX ADMIN — PRAVASTATIN SODIUM 40 MG: 40 TABLET ORAL at 11:47

## 2019-01-15 RX ADMIN — DONEPEZIL HYDROCHLORIDE 10 MG: 10 TABLET, FILM COATED ORAL at 11:48

## 2019-01-15 ASSESSMENT — PAIN SCALES - GENERAL: PAINLEVEL_OUTOF10: 0

## 2019-01-16 LAB
ALBUMIN SERPL-MCNC: 2.9 G/DL (ref 3.9–4.9)
ANION GAP SERPL CALCULATED.3IONS-SCNC: 13 MEQ/L (ref 7–13)
BUN BLDV-MCNC: 16 MG/DL (ref 8–23)
CALCIUM SERPL-MCNC: 8.4 MG/DL (ref 8.6–10.2)
CHLORIDE BLD-SCNC: 105 MEQ/L (ref 98–107)
CO2: 26 MEQ/L (ref 22–29)
CREAT SERPL-MCNC: 0.85 MG/DL (ref 0.7–1.2)
GFR AFRICAN AMERICAN: >60
GFR NON-AFRICAN AMERICAN: >60
GLUCOSE BLD-MCNC: 99 MG/DL (ref 74–109)
HCT VFR BLD CALC: 23.2 % (ref 42–52)
HEMOGLOBIN: 7.7 G/DL (ref 14–18)
MAGNESIUM: 2.1 MG/DL (ref 1.7–2.3)
MCH RBC QN AUTO: 30.5 PG (ref 27–31.3)
MCHC RBC AUTO-ENTMCNC: 33.1 % (ref 33–37)
MCV RBC AUTO: 92.2 FL (ref 80–100)
PDW BLD-RTO: 17 % (ref 11.5–14.5)
PHOSPHORUS: 3.1 MG/DL (ref 2.5–4.5)
PLATELET # BLD: 280 K/UL (ref 130–400)
POTASSIUM SERPL-SCNC: 3.7 MEQ/L (ref 3.5–5.1)
RBC # BLD: 2.52 M/UL (ref 4.7–6.1)
SODIUM BLD-SCNC: 144 MEQ/L (ref 132–144)
VALPROIC ACID LEVEL: 15.2 UG/ML (ref 50–100)
WBC # BLD: 7.1 K/UL (ref 4.8–10.8)

## 2019-01-16 PROCEDURE — 85027 COMPLETE CBC AUTOMATED: CPT

## 2019-01-16 PROCEDURE — 2580000003 HC RX 258: Performed by: SPECIALIST

## 2019-01-16 PROCEDURE — C9113 INJ PANTOPRAZOLE SODIUM, VIA: HCPCS | Performed by: INTERNAL MEDICINE

## 2019-01-16 PROCEDURE — 80069 RENAL FUNCTION PANEL: CPT

## 2019-01-16 PROCEDURE — 6370000000 HC RX 637 (ALT 250 FOR IP): Performed by: INTERNAL MEDICINE

## 2019-01-16 PROCEDURE — 1210000000 HC MED SURG R&B

## 2019-01-16 PROCEDURE — 83735 ASSAY OF MAGNESIUM: CPT

## 2019-01-16 PROCEDURE — 2580000003 HC RX 258: Performed by: INTERNAL MEDICINE

## 2019-01-16 PROCEDURE — 6370000000 HC RX 637 (ALT 250 FOR IP): Performed by: PSYCHIATRY & NEUROLOGY

## 2019-01-16 PROCEDURE — 80164 ASSAY DIPROPYLACETIC ACD TOT: CPT

## 2019-01-16 PROCEDURE — 36415 COLL VENOUS BLD VENIPUNCTURE: CPT

## 2019-01-16 PROCEDURE — 6360000002 HC RX W HCPCS: Performed by: INTERNAL MEDICINE

## 2019-01-16 RX ORDER — 0.9 % SODIUM CHLORIDE 0.9 %
10 VIAL (ML) INJECTION PRN
Status: DISCONTINUED | OUTPATIENT
Start: 2019-01-16 | End: 2019-01-19 | Stop reason: HOSPADM

## 2019-01-16 RX ORDER — 0.9 % SODIUM CHLORIDE 0.9 %
10 VIAL (ML) INJECTION EVERY 12 HOURS SCHEDULED
Status: DISCONTINUED | OUTPATIENT
Start: 2019-01-16 | End: 2019-01-19 | Stop reason: HOSPADM

## 2019-01-16 RX ADMIN — Medication 10 ML: at 21:20

## 2019-01-16 RX ADMIN — CARBIDOPA AND LEVODOPA 1 TABLET: 25; 100 TABLET ORAL at 13:02

## 2019-01-16 RX ADMIN — AMANTADINE HYDROCHLORIDE 100 MG: 100 CAPSULE ORAL at 09:47

## 2019-01-16 RX ADMIN — Medication 10 ML: at 09:36

## 2019-01-16 RX ADMIN — PANTOPRAZOLE SODIUM 40 MG: 40 INJECTION, POWDER, FOR SOLUTION INTRAVENOUS at 21:14

## 2019-01-16 RX ADMIN — AMANTADINE HYDROCHLORIDE 100 MG: 100 CAPSULE ORAL at 21:14

## 2019-01-16 RX ADMIN — QUETIAPINE FUMARATE 25 MG: 25 TABLET ORAL at 09:47

## 2019-01-16 RX ADMIN — PRAVASTATIN SODIUM 40 MG: 40 TABLET ORAL at 21:14

## 2019-01-16 RX ADMIN — DIVALPROEX SODIUM 250 MG: 250 TABLET, EXTENDED RELEASE ORAL at 21:14

## 2019-01-16 RX ADMIN — PANTOPRAZOLE SODIUM 40 MG: 40 INJECTION, POWDER, FOR SOLUTION INTRAVENOUS at 09:36

## 2019-01-16 RX ADMIN — DONEPEZIL HYDROCHLORIDE 10 MG: 10 TABLET, FILM COATED ORAL at 21:14

## 2019-01-16 RX ADMIN — FOLIC ACID 1 MG: 1 TABLET ORAL at 09:47

## 2019-01-16 RX ADMIN — Medication 10 ML: at 09:52

## 2019-01-16 RX ADMIN — Medication 10 ML: at 21:19

## 2019-01-16 RX ADMIN — DIVALPROEX SODIUM 250 MG: 250 TABLET, EXTENDED RELEASE ORAL at 09:47

## 2019-01-16 RX ADMIN — CARBIDOPA AND LEVODOPA 1 TABLET: 25; 100 TABLET ORAL at 21:14

## 2019-01-16 RX ADMIN — QUETIAPINE FUMARATE 25 MG: 25 TABLET ORAL at 13:02

## 2019-01-16 RX ADMIN — SERTRALINE HYDROCHLORIDE 50 MG: 50 TABLET ORAL at 09:47

## 2019-01-16 RX ADMIN — QUETIAPINE FUMARATE 25 MG: 25 TABLET ORAL at 21:14

## 2019-01-16 RX ADMIN — CARBIDOPA AND LEVODOPA 1 TABLET: 25; 100 TABLET ORAL at 09:47

## 2019-01-16 RX ADMIN — DONEPEZIL HYDROCHLORIDE 10 MG: 10 TABLET, FILM COATED ORAL at 09:47

## 2019-01-16 ASSESSMENT — PAIN SCALES - GENERAL: PAINLEVEL_OUTOF10: 0

## 2019-01-17 ENCOUNTER — APPOINTMENT (OUTPATIENT)
Dept: GENERAL RADIOLOGY | Age: 69
DRG: 375 | End: 2019-01-17
Payer: MEDICARE

## 2019-01-17 ENCOUNTER — ANESTHESIA EVENT (OUTPATIENT)
Dept: OPERATING ROOM | Age: 69
DRG: 375 | End: 2019-01-17
Payer: MEDICARE

## 2019-01-17 ENCOUNTER — ANESTHESIA (OUTPATIENT)
Dept: OPERATING ROOM | Age: 69
DRG: 375 | End: 2019-01-17
Payer: MEDICARE

## 2019-01-17 VITALS
OXYGEN SATURATION: 95 % | RESPIRATION RATE: 25 BRPM | SYSTOLIC BLOOD PRESSURE: 113 MMHG | DIASTOLIC BLOOD PRESSURE: 70 MMHG | TEMPERATURE: 94.5 F

## 2019-01-17 LAB
ALBUMIN SERPL-MCNC: 3 G/DL (ref 3.9–4.9)
ANION GAP SERPL CALCULATED.3IONS-SCNC: 13 MEQ/L (ref 7–13)
BUN BLDV-MCNC: 18 MG/DL (ref 8–23)
CALCIUM SERPL-MCNC: 8.4 MG/DL (ref 8.6–10.2)
CHLORIDE BLD-SCNC: 107 MEQ/L (ref 98–107)
CO2: 26 MEQ/L (ref 22–29)
CREAT SERPL-MCNC: 0.89 MG/DL (ref 0.7–1.2)
GFR AFRICAN AMERICAN: >60
GFR NON-AFRICAN AMERICAN: >60
GLUCOSE BLD-MCNC: 102 MG/DL (ref 74–109)
HCT VFR BLD CALC: 24.7 % (ref 42–52)
HEMOGLOBIN: 8.3 G/DL (ref 14–18)
MAGNESIUM: 2.2 MG/DL (ref 1.7–2.3)
MCH RBC QN AUTO: 31.1 PG (ref 27–31.3)
MCHC RBC AUTO-ENTMCNC: 33.8 % (ref 33–37)
MCV RBC AUTO: 92.1 FL (ref 80–100)
PDW BLD-RTO: 17.2 % (ref 11.5–14.5)
PHOSPHORUS: 3.4 MG/DL (ref 2.5–4.5)
PLATELET # BLD: 262 K/UL (ref 130–400)
POTASSIUM SERPL-SCNC: 4.1 MEQ/L (ref 3.5–5.1)
RBC # BLD: 2.68 M/UL (ref 4.7–6.1)
SODIUM BLD-SCNC: 146 MEQ/L (ref 132–144)
WBC # BLD: 7.8 K/UL (ref 4.8–10.8)

## 2019-01-17 PROCEDURE — 3700000001 HC ADD 15 MINUTES (ANESTHESIA): Performed by: SPECIALIST

## 2019-01-17 PROCEDURE — C9113 INJ PANTOPRAZOLE SODIUM, VIA: HCPCS | Performed by: INTERNAL MEDICINE

## 2019-01-17 PROCEDURE — 0D758DZ DILATION OF ESOPHAGUS WITH INTRALUMINAL DEVICE, VIA NATURAL OR ARTIFICIAL OPENING ENDOSCOPIC: ICD-10-PCS | Performed by: SPECIALIST

## 2019-01-17 PROCEDURE — 2580000003 HC RX 258: Performed by: NURSE ANESTHETIST, CERTIFIED REGISTERED

## 2019-01-17 PROCEDURE — 7100000000 HC PACU RECOVERY - FIRST 15 MIN: Performed by: SPECIALIST

## 2019-01-17 PROCEDURE — 80069 RENAL FUNCTION PANEL: CPT

## 2019-01-17 PROCEDURE — 3700000000 HC ANESTHESIA ATTENDED CARE: Performed by: SPECIALIST

## 2019-01-17 PROCEDURE — 3609017100 HC EGD: Performed by: SPECIALIST

## 2019-01-17 PROCEDURE — 2500000003 HC RX 250 WO HCPCS: Performed by: INTERNAL MEDICINE

## 2019-01-17 PROCEDURE — 85027 COMPLETE CBC AUTOMATED: CPT

## 2019-01-17 PROCEDURE — 6360000002 HC RX W HCPCS: Performed by: NURSE ANESTHETIST, CERTIFIED REGISTERED

## 2019-01-17 PROCEDURE — 83735 ASSAY OF MAGNESIUM: CPT

## 2019-01-17 PROCEDURE — 2580000003 HC RX 258: Performed by: SPECIALIST

## 2019-01-17 PROCEDURE — 6360000002 HC RX W HCPCS: Performed by: INTERNAL MEDICINE

## 2019-01-17 PROCEDURE — 2709999900 HC NON-CHARGEABLE SUPPLY: Performed by: SPECIALIST

## 2019-01-17 PROCEDURE — C1874 STENT, COATED/COV W/DEL SYS: HCPCS | Performed by: SPECIALIST

## 2019-01-17 PROCEDURE — 2580000003 HC RX 258: Performed by: INTERNAL MEDICINE

## 2019-01-17 PROCEDURE — C1769 GUIDE WIRE: HCPCS | Performed by: SPECIALIST

## 2019-01-17 PROCEDURE — 6370000000 HC RX 637 (ALT 250 FOR IP): Performed by: INTERNAL MEDICINE

## 2019-01-17 PROCEDURE — 6370000000 HC RX 637 (ALT 250 FOR IP): Performed by: PSYCHIATRY & NEUROLOGY

## 2019-01-17 PROCEDURE — 7100000001 HC PACU RECOVERY - ADDTL 15 MIN: Performed by: SPECIALIST

## 2019-01-17 PROCEDURE — 2500000003 HC RX 250 WO HCPCS: Performed by: NURSE ANESTHETIST, CERTIFIED REGISTERED

## 2019-01-17 PROCEDURE — 36415 COLL VENOUS BLD VENIPUNCTURE: CPT

## 2019-01-17 PROCEDURE — 1210000000 HC MED SURG R&B

## 2019-01-17 PROCEDURE — 3209999900 FLUORO FOR SURGICAL PROCEDURES

## 2019-01-17 DEVICE — STENT SYSTEM
Type: IMPLANTABLE DEVICE | Site: ESOPHAGUS | Status: FUNCTIONAL
Brand: WALLFLEX™ ESOPHAGEAL

## 2019-01-17 RX ORDER — GLYCOPYRROLATE 1 MG/5 ML
SYRINGE (ML) INTRAVENOUS PRN
Status: DISCONTINUED | OUTPATIENT
Start: 2019-01-17 | End: 2019-01-17 | Stop reason: SDUPTHER

## 2019-01-17 RX ORDER — PROPOFOL 10 MG/ML
INJECTION, EMULSION INTRAVENOUS PRN
Status: DISCONTINUED | OUTPATIENT
Start: 2019-01-17 | End: 2019-01-17 | Stop reason: SDUPTHER

## 2019-01-17 RX ORDER — HYDROCODONE BITARTRATE AND ACETAMINOPHEN 5; 325 MG/1; MG/1
2 TABLET ORAL PRN
Status: DISCONTINUED | OUTPATIENT
Start: 2019-01-17 | End: 2019-01-17

## 2019-01-17 RX ORDER — FENTANYL CITRATE 50 UG/ML
50 INJECTION, SOLUTION INTRAMUSCULAR; INTRAVENOUS EVERY 10 MIN PRN
Status: DISCONTINUED | OUTPATIENT
Start: 2019-01-17 | End: 2019-01-17

## 2019-01-17 RX ORDER — MAGNESIUM HYDROXIDE 1200 MG/15ML
LIQUID ORAL PRN
Status: DISCONTINUED | OUTPATIENT
Start: 2019-01-17 | End: 2019-01-17 | Stop reason: HOSPADM

## 2019-01-17 RX ORDER — DEXTROSE, SODIUM CHLORIDE, AND POTASSIUM CHLORIDE 5; .45; .075 G/100ML; G/100ML; G/100ML
INJECTION INTRAVENOUS CONTINUOUS
Status: DISCONTINUED | OUTPATIENT
Start: 2019-01-17 | End: 2019-01-18

## 2019-01-17 RX ORDER — METOCLOPRAMIDE HYDROCHLORIDE 5 MG/ML
10 INJECTION INTRAMUSCULAR; INTRAVENOUS
Status: DISCONTINUED | OUTPATIENT
Start: 2019-01-17 | End: 2019-01-17

## 2019-01-17 RX ORDER — LIDOCAINE HYDROCHLORIDE 20 MG/ML
INJECTION, SOLUTION INFILTRATION; PERINEURAL PRN
Status: DISCONTINUED | OUTPATIENT
Start: 2019-01-17 | End: 2019-01-17 | Stop reason: SDUPTHER

## 2019-01-17 RX ORDER — ONDANSETRON 2 MG/ML
4 INJECTION INTRAMUSCULAR; INTRAVENOUS
Status: DISCONTINUED | OUTPATIENT
Start: 2019-01-17 | End: 2019-01-17

## 2019-01-17 RX ORDER — SODIUM CHLORIDE, SODIUM LACTATE, POTASSIUM CHLORIDE, CALCIUM CHLORIDE 600; 310; 30; 20 MG/100ML; MG/100ML; MG/100ML; MG/100ML
INJECTION, SOLUTION INTRAVENOUS CONTINUOUS PRN
Status: DISCONTINUED | OUTPATIENT
Start: 2019-01-17 | End: 2019-01-17 | Stop reason: SDUPTHER

## 2019-01-17 RX ORDER — HYDROCODONE BITARTRATE AND ACETAMINOPHEN 5; 325 MG/1; MG/1
1 TABLET ORAL PRN
Status: DISCONTINUED | OUTPATIENT
Start: 2019-01-17 | End: 2019-01-17

## 2019-01-17 RX ORDER — MEPERIDINE HYDROCHLORIDE 25 MG/ML
12.5 INJECTION INTRAMUSCULAR; INTRAVENOUS; SUBCUTANEOUS EVERY 5 MIN PRN
Status: DISCONTINUED | OUTPATIENT
Start: 2019-01-17 | End: 2019-01-17

## 2019-01-17 RX ORDER — PROPOFOL 10 MG/ML
INJECTION, EMULSION INTRAVENOUS CONTINUOUS PRN
Status: DISCONTINUED | OUTPATIENT
Start: 2019-01-17 | End: 2019-01-17 | Stop reason: SDUPTHER

## 2019-01-17 RX ORDER — DIPHENHYDRAMINE HYDROCHLORIDE 50 MG/ML
12.5 INJECTION INTRAMUSCULAR; INTRAVENOUS
Status: DISCONTINUED | OUTPATIENT
Start: 2019-01-17 | End: 2019-01-17

## 2019-01-17 RX ADMIN — CARBIDOPA AND LEVODOPA 1 TABLET: 25; 100 TABLET ORAL at 16:11

## 2019-01-17 RX ADMIN — QUETIAPINE FUMARATE 25 MG: 25 TABLET ORAL at 09:21

## 2019-01-17 RX ADMIN — PROPOFOL 20 MG: 10 INJECTION, EMULSION INTRAVENOUS at 12:41

## 2019-01-17 RX ADMIN — Medication 10 ML: at 22:41

## 2019-01-17 RX ADMIN — LIDOCAINE HYDROCHLORIDE 50 MG: 20 INJECTION, SOLUTION INFILTRATION; PERINEURAL at 12:38

## 2019-01-17 RX ADMIN — CARBIDOPA AND LEVODOPA 1 TABLET: 25; 100 TABLET ORAL at 09:21

## 2019-01-17 RX ADMIN — Medication 10 ML: at 22:28

## 2019-01-17 RX ADMIN — PRAVASTATIN SODIUM 40 MG: 40 TABLET ORAL at 22:28

## 2019-01-17 RX ADMIN — FOLIC ACID 1 MG: 1 TABLET ORAL at 09:21

## 2019-01-17 RX ADMIN — DIVALPROEX SODIUM 250 MG: 250 TABLET, EXTENDED RELEASE ORAL at 22:28

## 2019-01-17 RX ADMIN — PROPOFOL 50 MCG/KG/MIN: 10 INJECTION, EMULSION INTRAVENOUS at 12:43

## 2019-01-17 RX ADMIN — PROPOFOL 50 MG: 10 INJECTION, EMULSION INTRAVENOUS at 12:38

## 2019-01-17 RX ADMIN — LIDOCAINE HYDROCHLORIDE 100 MG: 20 INJECTION, SOLUTION INFILTRATION; PERINEURAL at 12:53

## 2019-01-17 RX ADMIN — DONEPEZIL HYDROCHLORIDE 10 MG: 10 TABLET, FILM COATED ORAL at 09:21

## 2019-01-17 RX ADMIN — Medication 10 ML: at 22:29

## 2019-01-17 RX ADMIN — PROPOFOL 20 MG: 10 INJECTION, EMULSION INTRAVENOUS at 13:21

## 2019-01-17 RX ADMIN — Medication 0.1 MG: at 13:04

## 2019-01-17 RX ADMIN — POTASSIUM CHLORIDE, DEXTROSE MONOHYDRATE AND SODIUM CHLORIDE: 75; 5; 450 INJECTION, SOLUTION INTRAVENOUS at 17:34

## 2019-01-17 RX ADMIN — PANTOPRAZOLE SODIUM 40 MG: 40 INJECTION, POWDER, FOR SOLUTION INTRAVENOUS at 22:27

## 2019-01-17 RX ADMIN — DIVALPROEX SODIUM 250 MG: 250 TABLET, EXTENDED RELEASE ORAL at 09:21

## 2019-01-17 RX ADMIN — AMANTADINE HYDROCHLORIDE 100 MG: 100 CAPSULE ORAL at 09:21

## 2019-01-17 RX ADMIN — SODIUM CHLORIDE, POTASSIUM CHLORIDE, SODIUM LACTATE AND CALCIUM CHLORIDE: 600; 310; 30; 20 INJECTION, SOLUTION INTRAVENOUS at 12:08

## 2019-01-17 RX ADMIN — Medication 10 ML: at 09:24

## 2019-01-17 RX ADMIN — SERTRALINE HYDROCHLORIDE 50 MG: 50 TABLET ORAL at 09:21

## 2019-01-17 RX ADMIN — DONEPEZIL HYDROCHLORIDE 10 MG: 10 TABLET, FILM COATED ORAL at 22:28

## 2019-01-17 RX ADMIN — CARBIDOPA AND LEVODOPA 1 TABLET: 25; 100 TABLET ORAL at 22:28

## 2019-01-17 RX ADMIN — QUETIAPINE FUMARATE 25 MG: 25 TABLET ORAL at 22:28

## 2019-01-17 RX ADMIN — AMANTADINE HYDROCHLORIDE 100 MG: 100 CAPSULE ORAL at 22:28

## 2019-01-17 RX ADMIN — Medication 10 ML: at 09:22

## 2019-01-17 RX ADMIN — QUETIAPINE FUMARATE 25 MG: 25 TABLET ORAL at 16:11

## 2019-01-17 RX ADMIN — PANTOPRAZOLE SODIUM 40 MG: 40 INJECTION, POWDER, FOR SOLUTION INTRAVENOUS at 09:21

## 2019-01-17 ASSESSMENT — PULMONARY FUNCTION TESTS
PIF_VALUE: 0
PIF_VALUE: 1
PIF_VALUE: 0
PIF_VALUE: 1
PIF_VALUE: 0
PIF_VALUE: 3
PIF_VALUE: 0
PIF_VALUE: 0
PIF_VALUE: 1
PIF_VALUE: 0
PIF_VALUE: 1
PIF_VALUE: 0
PIF_VALUE: 0
PIF_VALUE: 1
PIF_VALUE: 0
PIF_VALUE: 1
PIF_VALUE: 0
PIF_VALUE: 0
PIF_VALUE: 1
PIF_VALUE: 0
PIF_VALUE: 1
PIF_VALUE: 0
PIF_VALUE: 1
PIF_VALUE: 0
PIF_VALUE: 1
PIF_VALUE: 1
PIF_VALUE: 0
PIF_VALUE: 1
PIF_VALUE: 0
PIF_VALUE: 1
PIF_VALUE: 0
PIF_VALUE: 1
PIF_VALUE: 0

## 2019-01-17 ASSESSMENT — COPD QUESTIONNAIRES: CAT_SEVERITY: MODERATE

## 2019-01-17 ASSESSMENT — ENCOUNTER SYMPTOMS: SHORTNESS OF BREATH: 1

## 2019-01-18 LAB
ALBUMIN SERPL-MCNC: 2.7 G/DL (ref 3.9–4.9)
ANION GAP SERPL CALCULATED.3IONS-SCNC: 11 MEQ/L (ref 7–13)
BUN BLDV-MCNC: 16 MG/DL (ref 8–23)
CALCIUM SERPL-MCNC: 8.1 MG/DL (ref 8.6–10.2)
CHLORIDE BLD-SCNC: 104 MEQ/L (ref 98–107)
CO2: 24 MEQ/L (ref 22–29)
CREAT SERPL-MCNC: 0.76 MG/DL (ref 0.7–1.2)
GFR AFRICAN AMERICAN: >60
GFR NON-AFRICAN AMERICAN: >60
GLUCOSE BLD-MCNC: 132 MG/DL (ref 74–109)
HCT VFR BLD CALC: 22.8 % (ref 42–52)
HCT VFR BLD CALC: 22.8 % (ref 42–52)
HCT VFR BLD CALC: 23.5 % (ref 42–52)
HCT VFR BLD CALC: 25.1 % (ref 42–52)
HEMOGLOBIN: 7.3 G/DL (ref 14–18)
HEMOGLOBIN: 7.4 G/DL (ref 14–18)
HEMOGLOBIN: 7.5 G/DL (ref 14–18)
HEMOGLOBIN: 8 G/DL (ref 14–18)
MAGNESIUM: 2 MG/DL (ref 1.7–2.3)
MCH RBC QN AUTO: 29.5 PG (ref 27–31.3)
MCHC RBC AUTO-ENTMCNC: 32 % (ref 33–37)
MCV RBC AUTO: 92.4 FL (ref 80–100)
PDW BLD-RTO: 17.1 % (ref 11.5–14.5)
PHOSPHORUS: 3 MG/DL (ref 2.5–4.5)
PLATELET # BLD: 243 K/UL (ref 130–400)
POTASSIUM SERPL-SCNC: 4 MEQ/L (ref 3.5–5.1)
RBC # BLD: 2.54 M/UL (ref 4.7–6.1)
SODIUM BLD-SCNC: 139 MEQ/L (ref 132–144)
WBC # BLD: 14.8 K/UL (ref 4.8–10.8)

## 2019-01-18 PROCEDURE — 6360000002 HC RX W HCPCS: Performed by: INTERNAL MEDICINE

## 2019-01-18 PROCEDURE — 2580000003 HC RX 258: Performed by: INTERNAL MEDICINE

## 2019-01-18 PROCEDURE — C9113 INJ PANTOPRAZOLE SODIUM, VIA: HCPCS | Performed by: INTERNAL MEDICINE

## 2019-01-18 PROCEDURE — 2700000000 HC OXYGEN THERAPY PER DAY

## 2019-01-18 PROCEDURE — 92526 ORAL FUNCTION THERAPY: CPT

## 2019-01-18 PROCEDURE — 87040 BLOOD CULTURE FOR BACTERIA: CPT

## 2019-01-18 PROCEDURE — 2580000003 HC RX 258: Performed by: SPECIALIST

## 2019-01-18 PROCEDURE — 6370000000 HC RX 637 (ALT 250 FOR IP): Performed by: PSYCHIATRY & NEUROLOGY

## 2019-01-18 PROCEDURE — 85027 COMPLETE CBC AUTOMATED: CPT

## 2019-01-18 PROCEDURE — 83735 ASSAY OF MAGNESIUM: CPT

## 2019-01-18 PROCEDURE — 2500000003 HC RX 250 WO HCPCS: Performed by: INTERNAL MEDICINE

## 2019-01-18 PROCEDURE — 85018 HEMOGLOBIN: CPT

## 2019-01-18 PROCEDURE — 85014 HEMATOCRIT: CPT

## 2019-01-18 PROCEDURE — 36415 COLL VENOUS BLD VENIPUNCTURE: CPT

## 2019-01-18 PROCEDURE — 1210000000 HC MED SURG R&B

## 2019-01-18 PROCEDURE — 80069 RENAL FUNCTION PANEL: CPT

## 2019-01-18 PROCEDURE — 6370000000 HC RX 637 (ALT 250 FOR IP): Performed by: INTERNAL MEDICINE

## 2019-01-18 RX ORDER — SODIUM CHLORIDE 9 MG/ML
INJECTION, SOLUTION INTRAVENOUS CONTINUOUS
Status: DISCONTINUED | OUTPATIENT
Start: 2019-01-18 | End: 2019-01-19 | Stop reason: HOSPADM

## 2019-01-18 RX ADMIN — POTASSIUM CHLORIDE, DEXTROSE MONOHYDRATE AND SODIUM CHLORIDE: 75; 5; 450 INJECTION, SOLUTION INTRAVENOUS at 06:33

## 2019-01-18 RX ADMIN — QUETIAPINE FUMARATE 25 MG: 25 TABLET ORAL at 20:11

## 2019-01-18 RX ADMIN — ACETAMINOPHEN 650 MG: 325 TABLET ORAL at 20:11

## 2019-01-18 RX ADMIN — CARBIDOPA AND LEVODOPA 1 TABLET: 25; 100 TABLET ORAL at 09:42

## 2019-01-18 RX ADMIN — Medication 10 ML: at 09:48

## 2019-01-18 RX ADMIN — PANTOPRAZOLE SODIUM 40 MG: 40 INJECTION, POWDER, FOR SOLUTION INTRAVENOUS at 20:10

## 2019-01-18 RX ADMIN — QUETIAPINE FUMARATE 25 MG: 25 TABLET ORAL at 09:43

## 2019-01-18 RX ADMIN — Medication 10 ML: at 09:42

## 2019-01-18 RX ADMIN — SODIUM CHLORIDE: 9 INJECTION, SOLUTION INTRAVENOUS at 15:30

## 2019-01-18 RX ADMIN — CEFTRIAXONE SODIUM 1 G: 1 INJECTION, POWDER, FOR SOLUTION INTRAMUSCULAR; INTRAVENOUS at 20:23

## 2019-01-18 RX ADMIN — SERTRALINE HYDROCHLORIDE 50 MG: 50 TABLET ORAL at 09:43

## 2019-01-18 RX ADMIN — DIVALPROEX SODIUM 250 MG: 250 TABLET, EXTENDED RELEASE ORAL at 09:42

## 2019-01-18 RX ADMIN — PANTOPRAZOLE SODIUM 40 MG: 40 INJECTION, POWDER, FOR SOLUTION INTRAVENOUS at 09:43

## 2019-01-18 RX ADMIN — PRAVASTATIN SODIUM 40 MG: 40 TABLET ORAL at 20:11

## 2019-01-18 RX ADMIN — DONEPEZIL HYDROCHLORIDE 10 MG: 10 TABLET, FILM COATED ORAL at 09:43

## 2019-01-18 RX ADMIN — FOLIC ACID 1 MG: 1 TABLET ORAL at 09:42

## 2019-01-18 RX ADMIN — ONDANSETRON 4 MG: 2 INJECTION INTRAMUSCULAR; INTRAVENOUS at 03:06

## 2019-01-18 RX ADMIN — CARBIDOPA AND LEVODOPA 1 TABLET: 25; 100 TABLET ORAL at 13:38

## 2019-01-18 RX ADMIN — Medication 10 ML: at 09:47

## 2019-01-18 RX ADMIN — Medication 10 ML: at 20:10

## 2019-01-18 RX ADMIN — AMANTADINE HYDROCHLORIDE 100 MG: 100 CAPSULE ORAL at 09:43

## 2019-01-18 RX ADMIN — QUETIAPINE FUMARATE 25 MG: 25 TABLET ORAL at 13:39

## 2019-01-18 RX ADMIN — CARBIDOPA AND LEVODOPA 1 TABLET: 25; 100 TABLET ORAL at 20:11

## 2019-01-18 RX ADMIN — DIVALPROEX SODIUM 250 MG: 250 TABLET, EXTENDED RELEASE ORAL at 20:11

## 2019-01-18 RX ADMIN — AMANTADINE HYDROCHLORIDE 100 MG: 100 CAPSULE ORAL at 20:11

## 2019-01-18 RX ADMIN — DONEPEZIL HYDROCHLORIDE 10 MG: 10 TABLET, FILM COATED ORAL at 20:12

## 2019-01-18 ASSESSMENT — PAIN SCALES - GENERAL
PAINLEVEL_OUTOF10: 0
PAINLEVEL_OUTOF10: 0

## 2019-01-19 VITALS
BODY MASS INDEX: 28.28 KG/M2 | DIASTOLIC BLOOD PRESSURE: 63 MMHG | TEMPERATURE: 99.5 F | HEIGHT: 66 IN | HEART RATE: 99 BPM | SYSTOLIC BLOOD PRESSURE: 144 MMHG | OXYGEN SATURATION: 96 % | RESPIRATION RATE: 16 BRPM | WEIGHT: 176 LBS

## 2019-01-19 LAB
ABO/RH: NORMAL
ALBUMIN SERPL-MCNC: 2.8 G/DL (ref 3.9–4.9)
ANION GAP SERPL CALCULATED.3IONS-SCNC: 12 MEQ/L (ref 7–13)
ANTIBODY SCREEN: NORMAL
BLOOD BANK DISPENSE STATUS: NORMAL
BLOOD BANK PRODUCT CODE: NORMAL
BPU ID: NORMAL
BUN BLDV-MCNC: 16 MG/DL (ref 8–23)
CALCIUM SERPL-MCNC: 8.2 MG/DL (ref 8.6–10.2)
CHLORIDE BLD-SCNC: 106 MEQ/L (ref 98–107)
CO2: 26 MEQ/L (ref 22–29)
CREAT SERPL-MCNC: 0.82 MG/DL (ref 0.7–1.2)
DESCRIPTION BLOOD BANK: NORMAL
GFR AFRICAN AMERICAN: >60
GFR NON-AFRICAN AMERICAN: >60
GLUCOSE BLD-MCNC: 102 MG/DL (ref 74–109)
HCT VFR BLD CALC: 20.2 % (ref 42–52)
HCT VFR BLD CALC: 21 % (ref 42–52)
HEMOGLOBIN: 6.4 G/DL (ref 14–18)
HEMOGLOBIN: 6.8 G/DL (ref 14–18)
MAGNESIUM: 2 MG/DL (ref 1.7–2.3)
MCH RBC QN AUTO: 29.4 PG (ref 27–31.3)
MCHC RBC AUTO-ENTMCNC: 32.1 % (ref 33–37)
MCV RBC AUTO: 91.6 FL (ref 80–100)
PDW BLD-RTO: 16.6 % (ref 11.5–14.5)
PHOSPHORUS: 2.7 MG/DL (ref 2.5–4.5)
PLATELET # BLD: 240 K/UL (ref 130–400)
POTASSIUM SERPL-SCNC: 3.6 MEQ/L (ref 3.5–5.1)
RBC # BLD: 2.3 M/UL (ref 4.7–6.1)
SODIUM BLD-SCNC: 144 MEQ/L (ref 132–144)
WBC # BLD: 17.3 K/UL (ref 4.8–10.8)

## 2019-01-19 PROCEDURE — 83735 ASSAY OF MAGNESIUM: CPT

## 2019-01-19 PROCEDURE — P9016 RBC LEUKOCYTES REDUCED: HCPCS

## 2019-01-19 PROCEDURE — 6370000000 HC RX 637 (ALT 250 FOR IP): Performed by: PSYCHIATRY & NEUROLOGY

## 2019-01-19 PROCEDURE — 99232 SBSQ HOSP IP/OBS MODERATE 35: CPT | Performed by: INTERNAL MEDICINE

## 2019-01-19 PROCEDURE — 6360000002 HC RX W HCPCS: Performed by: INTERNAL MEDICINE

## 2019-01-19 PROCEDURE — C9113 INJ PANTOPRAZOLE SODIUM, VIA: HCPCS | Performed by: INTERNAL MEDICINE

## 2019-01-19 PROCEDURE — 6370000000 HC RX 637 (ALT 250 FOR IP): Performed by: INTERNAL MEDICINE

## 2019-01-19 PROCEDURE — 2580000003 HC RX 258: Performed by: INTERNAL MEDICINE

## 2019-01-19 PROCEDURE — 86850 RBC ANTIBODY SCREEN: CPT

## 2019-01-19 PROCEDURE — 86900 BLOOD TYPING SEROLOGIC ABO: CPT

## 2019-01-19 PROCEDURE — 86923 COMPATIBILITY TEST ELECTRIC: CPT

## 2019-01-19 PROCEDURE — 36430 TRANSFUSION BLD/BLD COMPNT: CPT

## 2019-01-19 PROCEDURE — 36415 COLL VENOUS BLD VENIPUNCTURE: CPT

## 2019-01-19 PROCEDURE — 85027 COMPLETE CBC AUTOMATED: CPT

## 2019-01-19 PROCEDURE — 80069 RENAL FUNCTION PANEL: CPT

## 2019-01-19 PROCEDURE — 86901 BLOOD TYPING SEROLOGIC RH(D): CPT

## 2019-01-19 RX ORDER — CEFUROXIME AXETIL 500 MG/1
500 TABLET ORAL 2 TIMES DAILY
Qty: 14 TABLET | Refills: 0 | Status: SHIPPED | OUTPATIENT
Start: 2019-01-19 | End: 2019-01-26

## 2019-01-19 RX ORDER — PANTOPRAZOLE SODIUM 40 MG/1
40 TABLET, DELAYED RELEASE ORAL
Qty: 30 TABLET | Refills: 0 | Status: SHIPPED | OUTPATIENT
Start: 2019-01-19

## 2019-01-19 RX ORDER — 0.9 % SODIUM CHLORIDE 0.9 %
250 INTRAVENOUS SOLUTION INTRAVENOUS ONCE
Status: DISCONTINUED | OUTPATIENT
Start: 2019-01-19 | End: 2019-01-19 | Stop reason: HOSPADM

## 2019-01-19 RX ADMIN — AMANTADINE HYDROCHLORIDE 100 MG: 100 CAPSULE ORAL at 09:08

## 2019-01-19 RX ADMIN — PANTOPRAZOLE SODIUM 40 MG: 40 INJECTION, POWDER, FOR SOLUTION INTRAVENOUS at 09:08

## 2019-01-19 RX ADMIN — SODIUM CHLORIDE 75 ML/HR: 9 INJECTION, SOLUTION INTRAVENOUS at 03:53

## 2019-01-19 RX ADMIN — FOLIC ACID 1 MG: 1 TABLET ORAL at 09:08

## 2019-01-19 RX ADMIN — QUETIAPINE FUMARATE 25 MG: 25 TABLET ORAL at 09:08

## 2019-01-19 RX ADMIN — DIVALPROEX SODIUM 250 MG: 250 TABLET, EXTENDED RELEASE ORAL at 09:08

## 2019-01-19 RX ADMIN — DONEPEZIL HYDROCHLORIDE 10 MG: 10 TABLET, FILM COATED ORAL at 09:08

## 2019-01-19 RX ADMIN — SERTRALINE HYDROCHLORIDE 50 MG: 50 TABLET ORAL at 09:08

## 2019-01-19 RX ADMIN — CARBIDOPA AND LEVODOPA 1 TABLET: 25; 100 TABLET ORAL at 09:08

## 2019-01-24 LAB
BLOOD CULTURE, ROUTINE: NORMAL
CULTURE, BLOOD 2: NORMAL
